# Patient Record
Sex: FEMALE | Race: WHITE | Employment: FULL TIME | URBAN - METROPOLITAN AREA
[De-identification: names, ages, dates, MRNs, and addresses within clinical notes are randomized per-mention and may not be internally consistent; named-entity substitution may affect disease eponyms.]

---

## 2018-01-17 ENCOUNTER — OFFICE VISIT (OUTPATIENT)
Dept: ENDOCRINOLOGY | Age: 48
End: 2018-01-17

## 2018-01-17 VITALS
DIASTOLIC BLOOD PRESSURE: 71 MMHG | SYSTOLIC BLOOD PRESSURE: 111 MMHG | HEART RATE: 66 BPM | HEIGHT: 62 IN | BODY MASS INDEX: 29.44 KG/M2 | WEIGHT: 160 LBS

## 2018-01-17 DIAGNOSIS — E06.3 HYPOTHYROIDISM DUE TO HASHIMOTO'S THYROIDITIS: Primary | ICD-10-CM

## 2018-01-17 DIAGNOSIS — E66.09 CLASS 1 OBESITY DUE TO EXCESS CALORIES WITHOUT SERIOUS COMORBIDITY IN ADULT, UNSPECIFIED BMI: ICD-10-CM

## 2018-01-17 DIAGNOSIS — F41.9 ANXIETY: ICD-10-CM

## 2018-01-17 DIAGNOSIS — E66.09 CLASS 1 OBESITY DUE TO EXCESS CALORIES WITHOUT SERIOUS COMORBIDITY WITH BODY MASS INDEX (BMI) OF 30.0 TO 30.9 IN ADULT: ICD-10-CM

## 2018-01-17 DIAGNOSIS — E03.8 HYPOTHYROIDISM DUE TO HASHIMOTO'S THYROIDITIS: Primary | ICD-10-CM

## 2018-01-17 PROBLEM — E66.811 CLASS 1 OBESITY DUE TO EXCESS CALORIES WITHOUT SERIOUS COMORBIDITY WITH BODY MASS INDEX (BMI) OF 30.0 TO 30.9 IN ADULT: Status: ACTIVE | Noted: 2018-01-17

## 2018-01-17 PROCEDURE — 99215 OFFICE O/P EST HI 40 MIN: CPT | Performed by: INTERNAL MEDICINE

## 2018-01-17 RX ORDER — PHENTERMINE HYDROCHLORIDE 37.5 MG/1
37.5 TABLET ORAL
Qty: 30 TABLET | Refills: 1 | Status: SHIPPED | OUTPATIENT
Start: 2018-01-17 | End: 2018-02-16

## 2018-01-17 RX ORDER — LEVOTHYROXINE AND LIOTHYRONINE 57; 13.5 UG/1; UG/1
90 TABLET ORAL DAILY
Qty: 90 TABLET | Refills: 1 | Status: SHIPPED | OUTPATIENT
Start: 2018-01-17 | End: 2018-05-07 | Stop reason: SDUPTHER

## 2018-01-17 ASSESSMENT — PATIENT HEALTH QUESTIONNAIRE - PHQ9
2. FEELING DOWN, DEPRESSED OR HOPELESS: 0
SUM OF ALL RESPONSES TO PHQ9 QUESTIONS 1 & 2: 0
1. LITTLE INTEREST OR PLEASURE IN DOING THINGS: 0
SUM OF ALL RESPONSES TO PHQ QUESTIONS 1-9: 0

## 2018-01-17 NOTE — PATIENT INSTRUCTIONS
you otherwise think the medication is not working properly. Taking more of this medication will not make it more effective and can cause serious, life-threatening side effects. Phentermine should be taken only for a short time, such as a few weeks. Do not stop using phentermine suddenly, or you could have unpleasant withdrawal symptoms. Ask your doctor how to safely stop using phentermine. Store at room temperature away from moisture and heat. Keep track of the amount of medicine used from each new bottle. Phentermine is a drug of abuse and you should be aware if anyone is using your medicine improperly or without a prescription. What happens if I miss a dose? Take the missed dose as soon as you remember. Skip the missed dose if it is almost time for your next scheduled dose. Do not  take extra medicine to make up the missed dose. What happens if I overdose? Seek emergency medical attention or call the Poison Help line at 1-726.454.2095. An overdose of phentermine can be fatal.  What should I avoid while taking phentermine? Drinking alcohol can increase certain side effects of phentermine. Phentermine may impair your thinking or reactions. Be careful if you drive or do anything that requires you to be alert. What are the possible side effects of phentermine? Get emergency medical help if you have any of these signs of an allergic reaction: hives; wheezing, chest tightness, trouble breathing; swelling of your face, lips, tongue, or throat.   Call your doctor at once if you have a serious side effect such as:  · feeling short of breath, even with mild exertion;  · chest pain, feeling like you might pass out;  · swelling in your ankles or feet;  · pounding heartbeats or fluttering in your chest;  · confusion or irritability, unusual thoughts or behavior;  · feelings of extreme happiness or sadness; or  · dangerously high blood pressure (severe headache, blurred vision, buzzing in your ears, anxiety, chest pain, shortness of breath, uneven heartbeats, seizure). Common side effects may include:  · feeling restless or hyperactive;  · headache, dizziness, tremors;  · sleep problems (insomnia);  · dry mouth or an unpleasant taste in your mouth;  · diarrhea or constipation, upset stomach; or  · increased or decreased interest in sex, impotence. This is not a complete list of side effects and others may occur. Call your doctor for medical advice about side effects. You may report side effects to FDA at 4-157-FDA-7456. What other drugs will affect phentermine? Taking this medicine with other stimulant drugs that make you restless or hyperactive can worsen these effects. Ask your doctor before taking phentermine with diet pills, other stimulants, or medicine to treat attention deficit hyperactivity disorder (ADHD). Tell your doctor about all medicines you use, and those you start or stop using during your treatment with phentermine, especially:  · an antidepressant --citalopram, escitalopram, fluoxetine, fluvoxamine, paroxetine, sertraline. This list is not complete. Other drugs may interact with phentermine, including prescription and over-the-counter medicines, vitamins, and herbal products. Not all possible interactions are listed in this medication guide. Where can I get more information? Your pharmacist can provide more information about phentermine. Remember, keep this and all other medicines out of the reach of children, never share your medicines with others, and use this medication only for the indication prescribed. Every effort has been made to ensure that the information provided by [address] is accurate, up-to-date, and complete, but no guarantee is made to that effect. Drug information contained herein may be time sensitive.  [facility] information has been compiled for use by healthcare practitioners and consumers in the [place] and therefore [facility] does not warrant that uses outside of the [place] are appropriate, unless specifically indicated otherwise. [facility]'s drug information does not endorse drugs, diagnose patients or recommend therapy. [facility] drug information is an informational resource designed to assist licensed healthcare practitioners in caring for their patients and/or to serve consumers viewing this service as a supplement to, and not a substitute for, the expertise, skill, knowledge and judgment of healthcare practitioners. The absence of a warning for a given drug or drug combination in no way should be construed to indicate that the drug or drug combination is safe, effective or appropriate for any given patient. [facility] does not assume any responsibility for any aspect of healthcare administered with the aid of information [facility] provides. The information contained herein is not intended to cover all possible uses, directions, precautions, warnings, drug interactions, allergic reactions, or adverse effects. If you have questions about the drugs you are taking, check with your doctor, nurse or pharmacist.  Copyright 9444-3885 [address]. Version: 9.02. Revision date: 10/4/2013. Care instructions adapted under license by [facility] ([facility]). If you have questions about a medical condition or this instruction, always ask your healthcare professional. [place] [address] any warranty or liability for your use of this information.

## 2018-01-18 DIAGNOSIS — E03.8 HYPOTHYROIDISM DUE TO HASHIMOTO'S THYROIDITIS: ICD-10-CM

## 2018-01-18 DIAGNOSIS — E06.3 HYPOTHYROIDISM DUE TO HASHIMOTO'S THYROIDITIS: ICD-10-CM

## 2018-01-18 DIAGNOSIS — E66.09 CLASS 1 OBESITY DUE TO EXCESS CALORIES WITHOUT SERIOUS COMORBIDITY IN ADULT, UNSPECIFIED BMI: ICD-10-CM

## 2018-01-18 LAB
ANION GAP SERPL CALCULATED.3IONS-SCNC: 13 MMOL/L (ref 3–16)
BUN BLDV-MCNC: 13 MG/DL (ref 7–20)
CALCIUM SERPL-MCNC: 9.3 MG/DL (ref 8.3–10.6)
CHLORIDE BLD-SCNC: 108 MMOL/L (ref 99–110)
CHOLESTEROL, TOTAL: 163 MG/DL (ref 0–199)
CO2: 25 MMOL/L (ref 21–32)
CREAT SERPL-MCNC: 0.6 MG/DL (ref 0.6–1.1)
GFR AFRICAN AMERICAN: >60
GFR NON-AFRICAN AMERICAN: >60
GLUCOSE BLD-MCNC: 85 MG/DL (ref 70–99)
GONADOTROPIN, CHORIONIC (HCG) QUANT: <5 MIU/ML
HDLC SERPL-MCNC: 81 MG/DL (ref 40–60)
LDL CHOLESTEROL CALCULATED: 72 MG/DL
POTASSIUM SERPL-SCNC: 4.2 MMOL/L (ref 3.5–5.1)
SODIUM BLD-SCNC: 146 MMOL/L (ref 136–145)
TRIGL SERPL-MCNC: 50 MG/DL (ref 0–150)
VLDLC SERPL CALC-MCNC: 10 MG/DL

## 2018-01-20 LAB — TSH, 3RD GENERATION: 0.89 MU/L (ref 0.3–4)

## 2018-02-28 DIAGNOSIS — E66.09 CLASS 1 OBESITY DUE TO EXCESS CALORIES WITHOUT SERIOUS COMORBIDITY WITH BODY MASS INDEX (BMI) OF 30.0 TO 30.9 IN ADULT: Primary | ICD-10-CM

## 2018-02-28 RX ORDER — PHENTERMINE HYDROCHLORIDE 37.5 MG/1
37.5 CAPSULE ORAL EVERY MORNING
Qty: 30 CAPSULE | Refills: 1 | Status: SHIPPED | OUTPATIENT
Start: 2018-02-28 | End: 2018-03-30

## 2018-02-28 RX ORDER — PHENTERMINE HYDROCHLORIDE 37.5 MG/1
37.5 TABLET ORAL
Qty: 30 TABLET | Refills: 0 | Status: CANCELLED | OUTPATIENT
Start: 2018-02-28 | End: 2018-03-30

## 2018-05-04 DIAGNOSIS — E06.3 HYPOTHYROIDISM DUE TO HASHIMOTO'S THYROIDITIS: ICD-10-CM

## 2018-05-04 DIAGNOSIS — E03.8 HYPOTHYROIDISM DUE TO HASHIMOTO'S THYROIDITIS: ICD-10-CM

## 2018-05-04 DIAGNOSIS — E66.09 CLASS 1 OBESITY DUE TO EXCESS CALORIES WITHOUT SERIOUS COMORBIDITY WITH BODY MASS INDEX (BMI) OF 30.0 TO 30.9 IN ADULT: ICD-10-CM

## 2018-05-04 LAB
A/G RATIO: 1.7 (ref 1.1–2.2)
ALBUMIN SERPL-MCNC: 4.3 G/DL (ref 3.4–5)
ALP BLD-CCNC: 58 U/L (ref 40–129)
ALT SERPL-CCNC: 14 U/L (ref 10–40)
ANION GAP SERPL CALCULATED.3IONS-SCNC: 14 MMOL/L (ref 3–16)
AST SERPL-CCNC: 18 U/L (ref 15–37)
BILIRUB SERPL-MCNC: 0.3 MG/DL (ref 0–1)
BUN BLDV-MCNC: 10 MG/DL (ref 7–20)
CALCIUM SERPL-MCNC: 9.1 MG/DL (ref 8.3–10.6)
CHLORIDE BLD-SCNC: 103 MMOL/L (ref 99–110)
CO2: 25 MMOL/L (ref 21–32)
CREAT SERPL-MCNC: 0.6 MG/DL (ref 0.6–1.1)
GFR AFRICAN AMERICAN: >60
GFR NON-AFRICAN AMERICAN: >60
GLOBULIN: 2.6 G/DL
GLUCOSE BLD-MCNC: 110 MG/DL (ref 70–99)
GONADOTROPIN, CHORIONIC (HCG) QUANT: <5 MIU/ML
POTASSIUM SERPL-SCNC: 5 MMOL/L (ref 3.5–5.1)
SODIUM BLD-SCNC: 142 MMOL/L (ref 136–145)
TOTAL PROTEIN: 6.9 G/DL (ref 6.4–8.2)
TSH SERPL DL<=0.05 MIU/L-ACNC: 0.49 UIU/ML (ref 0.27–4.2)

## 2018-05-07 ENCOUNTER — OFFICE VISIT (OUTPATIENT)
Dept: ENDOCRINOLOGY | Age: 48
End: 2018-05-07

## 2018-05-07 VITALS
HEIGHT: 62 IN | BODY MASS INDEX: 27.57 KG/M2 | OXYGEN SATURATION: 100 % | WEIGHT: 149.8 LBS | DIASTOLIC BLOOD PRESSURE: 80 MMHG | HEART RATE: 99 BPM | SYSTOLIC BLOOD PRESSURE: 124 MMHG

## 2018-05-07 DIAGNOSIS — E06.3 HYPOTHYROIDISM DUE TO HASHIMOTO'S THYROIDITIS: ICD-10-CM

## 2018-05-07 DIAGNOSIS — E03.8 HYPOTHYROIDISM DUE TO HASHIMOTO'S THYROIDITIS: ICD-10-CM

## 2018-05-07 PROCEDURE — 99214 OFFICE O/P EST MOD 30 MIN: CPT | Performed by: INTERNAL MEDICINE

## 2018-05-07 RX ORDER — LEVOTHYROXINE AND LIOTHYRONINE 57; 13.5 UG/1; UG/1
90 TABLET ORAL DAILY
Qty: 30 TABLET | Refills: 5 | Status: SHIPPED | OUTPATIENT
Start: 2018-05-07 | End: 2019-02-07 | Stop reason: SDUPTHER

## 2018-05-07 ASSESSMENT — PATIENT HEALTH QUESTIONNAIRE - PHQ9
1. LITTLE INTEREST OR PLEASURE IN DOING THINGS: 0
2. FEELING DOWN, DEPRESSED OR HOPELESS: 0
SUM OF ALL RESPONSES TO PHQ QUESTIONS 1-9: 0
SUM OF ALL RESPONSES TO PHQ9 QUESTIONS 1 & 2: 0

## 2018-06-27 ENCOUNTER — TELEPHONE (OUTPATIENT)
Dept: ENDOCRINOLOGY | Age: 48
End: 2018-06-27

## 2018-07-02 NOTE — TELEPHONE ENCOUNTER
lmov pt can p/u sample at [facility] location. m-thurs 8-4:30. Closed on the 4th. Asked Pt to return phone call with any questions.

## 2018-07-19 RX ORDER — LIRAGLUTIDE 6 MG/ML
INJECTION, SOLUTION SUBCUTANEOUS
Qty: 1 PEN | Refills: 0 | COMMUNITY
Start: 2018-07-19 | End: 2019-03-06

## 2018-07-19 NOTE — TELEPHONE ENCOUNTER
Pt said she wasn't aware saxenda would be an injectable medication. She is unsure if she is comfortable giving herself injection. Pt needs to know dosing instruction for saxenda, she did  the medication. She is aware another option could be referral to weight loss clinic. Patient aware Dr. Raquel Renae will be out of the country from 7/19/18 to 7/31/18. Responses to message can take 72 hours.

## 2019-02-07 DIAGNOSIS — E03.8 HYPOTHYROIDISM DUE TO HASHIMOTO'S THYROIDITIS: ICD-10-CM

## 2019-02-07 DIAGNOSIS — E06.3 HYPOTHYROIDISM DUE TO HASHIMOTO'S THYROIDITIS: ICD-10-CM

## 2019-02-07 RX ORDER — THYROID,PORK 90 MG
TABLET ORAL
Qty: 30 TABLET | Refills: 0 | Status: SHIPPED | OUTPATIENT
Start: 2019-02-07 | End: 2019-03-06 | Stop reason: SDUPTHER

## 2019-03-06 ENCOUNTER — OFFICE VISIT (OUTPATIENT)
Dept: ENDOCRINOLOGY | Age: 49
End: 2019-03-06
Payer: COMMERCIAL

## 2019-03-06 VITALS
SYSTOLIC BLOOD PRESSURE: 100 MMHG | DIASTOLIC BLOOD PRESSURE: 72 MMHG | HEIGHT: 62 IN | BODY MASS INDEX: 31.83 KG/M2 | OXYGEN SATURATION: 99 % | HEART RATE: 84 BPM | WEIGHT: 173 LBS

## 2019-03-06 DIAGNOSIS — E06.3 HYPOTHYROIDISM DUE TO HASHIMOTO'S THYROIDITIS: ICD-10-CM

## 2019-03-06 DIAGNOSIS — E03.8 HYPOTHYROIDISM DUE TO HASHIMOTO'S THYROIDITIS: ICD-10-CM

## 2019-03-06 DIAGNOSIS — E66.9 OBESITY (BMI 30-39.9): Primary | ICD-10-CM

## 2019-03-06 DIAGNOSIS — E66.9 OBESITY (BMI 30-39.9): ICD-10-CM

## 2019-03-06 PROCEDURE — 99214 OFFICE O/P EST MOD 30 MIN: CPT | Performed by: INTERNAL MEDICINE

## 2019-03-06 RX ORDER — PHENTERMINE HYDROCHLORIDE 37.5 MG/1
37.5 TABLET ORAL
Qty: 30 TABLET | Refills: 3 | Status: SHIPPED | OUTPATIENT
Start: 2019-03-06 | End: 2019-04-02 | Stop reason: SDUPTHER

## 2019-03-06 RX ORDER — THYROID,PORK 90 MG
TABLET ORAL
Qty: 30 TABLET | Refills: 3 | Status: SHIPPED | OUTPATIENT
Start: 2019-03-06 | End: 2019-06-05 | Stop reason: SDUPTHER

## 2019-03-07 LAB — GONADOTROPIN, CHORIONIC (HCG) QUANT: <5 MIU/ML

## 2019-03-13 DIAGNOSIS — E06.3 HYPOTHYROIDISM DUE TO HASHIMOTO'S THYROIDITIS: ICD-10-CM

## 2019-03-13 DIAGNOSIS — E03.8 HYPOTHYROIDISM DUE TO HASHIMOTO'S THYROIDITIS: ICD-10-CM

## 2019-03-13 DIAGNOSIS — E66.9 OBESITY (BMI 30-39.9): ICD-10-CM

## 2019-04-03 ENCOUNTER — NURSE ONLY (OUTPATIENT)
Dept: ENDOCRINOLOGY | Age: 49
End: 2019-04-03

## 2019-04-03 VITALS
DIASTOLIC BLOOD PRESSURE: 74 MMHG | HEART RATE: 89 BPM | BODY MASS INDEX: 31.1 KG/M2 | SYSTOLIC BLOOD PRESSURE: 106 MMHG | OXYGEN SATURATION: 98 % | WEIGHT: 169 LBS | HEIGHT: 62 IN

## 2019-04-03 RX ORDER — PHENTERMINE HYDROCHLORIDE 37.5 MG/1
37.5 TABLET ORAL
Qty: 30 TABLET | Refills: 0 | Status: SHIPPED | OUTPATIENT
Start: 2019-04-03 | End: 2019-05-01 | Stop reason: SDUPTHER

## 2019-05-01 ENCOUNTER — NURSE ONLY (OUTPATIENT)
Dept: ENDOCRINOLOGY | Age: 49
End: 2019-05-01

## 2019-05-01 VITALS
BODY MASS INDEX: 30.25 KG/M2 | DIASTOLIC BLOOD PRESSURE: 80 MMHG | OXYGEN SATURATION: 98 % | SYSTOLIC BLOOD PRESSURE: 122 MMHG | HEIGHT: 62 IN | HEART RATE: 84 BPM | WEIGHT: 164.4 LBS

## 2019-05-01 DIAGNOSIS — E03.8 HYPOTHYROIDISM DUE TO HASHIMOTO'S THYROIDITIS: ICD-10-CM

## 2019-05-01 DIAGNOSIS — E06.3 HYPOTHYROIDISM DUE TO HASHIMOTO'S THYROIDITIS: ICD-10-CM

## 2019-05-01 DIAGNOSIS — E66.9 OBESITY (BMI 30-39.9): ICD-10-CM

## 2019-05-01 RX ORDER — PHENTERMINE HYDROCHLORIDE 37.5 MG/1
37.5 TABLET ORAL
Qty: 30 TABLET | Refills: 0 | Status: SHIPPED | OUTPATIENT
Start: 2019-05-01 | End: 2019-05-31

## 2019-06-03 DIAGNOSIS — E06.3 HYPOTHYROIDISM DUE TO HASHIMOTO'S THYROIDITIS: ICD-10-CM

## 2019-06-03 DIAGNOSIS — E66.9 OBESITY (BMI 30-39.9): ICD-10-CM

## 2019-06-03 DIAGNOSIS — E03.8 HYPOTHYROIDISM DUE TO HASHIMOTO'S THYROIDITIS: ICD-10-CM

## 2019-06-03 LAB
A/G RATIO: 1.8 (ref 1.1–2.2)
ALBUMIN SERPL-MCNC: 4.3 G/DL (ref 3.4–5)
ALP BLD-CCNC: 60 U/L (ref 40–129)
ALT SERPL-CCNC: 22 U/L (ref 10–40)
ANION GAP SERPL CALCULATED.3IONS-SCNC: 15 MMOL/L (ref 3–16)
AST SERPL-CCNC: 23 U/L (ref 15–37)
BILIRUB SERPL-MCNC: 0.4 MG/DL (ref 0–1)
BUN BLDV-MCNC: 10 MG/DL (ref 7–20)
CALCIUM SERPL-MCNC: 9.2 MG/DL (ref 8.3–10.6)
CHLORIDE BLD-SCNC: 102 MMOL/L (ref 99–110)
CHOLESTEROL, TOTAL: 143 MG/DL (ref 0–199)
CO2: 23 MMOL/L (ref 21–32)
CREAT SERPL-MCNC: 0.6 MG/DL (ref 0.6–1.1)
GFR AFRICAN AMERICAN: >60
GFR NON-AFRICAN AMERICAN: >60
GLOBULIN: 2.4 G/DL
GLUCOSE BLD-MCNC: 125 MG/DL (ref 70–99)
GONADOTROPIN, CHORIONIC (HCG) QUANT: <5 MIU/ML
HDLC SERPL-MCNC: 64 MG/DL (ref 40–60)
LDL CHOLESTEROL CALCULATED: 68 MG/DL
POTASSIUM SERPL-SCNC: 4.9 MMOL/L (ref 3.5–5.1)
SODIUM BLD-SCNC: 140 MMOL/L (ref 136–145)
TOTAL PROTEIN: 6.7 G/DL (ref 6.4–8.2)
TRIGL SERPL-MCNC: 56 MG/DL (ref 0–150)
TSH SERPL DL<=0.05 MIU/L-ACNC: 0.7 UIU/ML (ref 0.27–4.2)
VITAMIN D 25-HYDROXY: 49.8 NG/ML
VLDLC SERPL CALC-MCNC: 11 MG/DL

## 2019-06-05 ENCOUNTER — OFFICE VISIT (OUTPATIENT)
Dept: ENDOCRINOLOGY | Age: 49
End: 2019-06-05
Payer: COMMERCIAL

## 2019-06-05 ENCOUNTER — TELEPHONE (OUTPATIENT)
Dept: ENDOCRINOLOGY | Age: 49
End: 2019-06-05

## 2019-06-05 VITALS
HEIGHT: 62 IN | DIASTOLIC BLOOD PRESSURE: 76 MMHG | BODY MASS INDEX: 29.59 KG/M2 | SYSTOLIC BLOOD PRESSURE: 106 MMHG | WEIGHT: 160.8 LBS | OXYGEN SATURATION: 100 % | HEART RATE: 81 BPM

## 2019-06-05 DIAGNOSIS — E03.8 HYPOTHYROIDISM DUE TO HASHIMOTO'S THYROIDITIS: Primary | ICD-10-CM

## 2019-06-05 DIAGNOSIS — E06.3 HYPOTHYROIDISM DUE TO HASHIMOTO'S THYROIDITIS: Primary | ICD-10-CM

## 2019-06-05 DIAGNOSIS — E66.09 CLASS 1 OBESITY DUE TO EXCESS CALORIES WITHOUT SERIOUS COMORBIDITY WITH BODY MASS INDEX (BMI) OF 30.0 TO 30.9 IN ADULT: ICD-10-CM

## 2019-06-05 DIAGNOSIS — E66.9 OBESITY (BMI 30-39.9): ICD-10-CM

## 2019-06-05 DIAGNOSIS — F41.9 ANXIETY: ICD-10-CM

## 2019-06-05 PROCEDURE — 99214 OFFICE O/P EST MOD 30 MIN: CPT | Performed by: INTERNAL MEDICINE

## 2019-06-05 RX ORDER — PHENTERMINE HYDROCHLORIDE 37.5 MG/1
37.5 TABLET ORAL
COMMUNITY
End: 2019-06-05 | Stop reason: SDUPTHER

## 2019-06-05 RX ORDER — PHENTERMINE HYDROCHLORIDE 37.5 MG/1
TABLET ORAL
Qty: 30 TABLET | Refills: 0 | Status: SHIPPED | OUTPATIENT
Start: 2019-06-05 | End: 2019-07-03 | Stop reason: SDUPTHER

## 2019-06-05 RX ORDER — THYROID,PORK 90 MG
TABLET ORAL
Qty: 30 TABLET | Refills: 3 | Status: SHIPPED | OUTPATIENT
Start: 2019-06-05 | End: 2019-09-11 | Stop reason: SDUPTHER

## 2019-06-05 NOTE — PROGRESS NOTES
Class 1 obesity due to excess calories without serious comorbidity with body mass index (BMI) of 30.0 to 30.9 in adult 01/17/2018    Anxiety 01/17/2018     History reviewed. No pertinent surgical history. Family History   Problem Relation Age of Onset    Cancer Mother     Cancer Father      Social History     Socioeconomic History    Marital status:      Spouse name: None    Number of children: None    Years of education: None    Highest education level: None   Occupational History    None   Social Needs    Financial resource strain: None    Food insecurity:     Worry: None     Inability: None    Transportation needs:     Medical: None     Non-medical: None   Tobacco Use    Smoking status: Never Smoker    Smokeless tobacco: Never Used   Substance and Sexual Activity    Alcohol use: Yes     Comment: sometime    Drug use: No    Sexual activity: None   Lifestyle    Physical activity:     Days per week: None     Minutes per session: None    Stress: None   Relationships    Social connections:     Talks on phone: None     Gets together: None     Attends Druze service: None     Active member of club or organization: None     Attends meetings of clubs or organizations: None     Relationship status: None    Intimate partner violence:     Fear of current or ex partner: None     Emotionally abused: None     Physically abused: None     Forced sexual activity: None   Other Topics Concern    None   Social History Narrative    None     Current Outpatient Medications   Medication Sig Dispense Refill    phentermine (ADIPEX-P) 37.5 MG tablet Take 37.5 mg by mouth every morning (before breakfast).  ARMOUR THYROID 90 MG tablet TAKE 1 TABLET BY MOUTH EVERY DAY 30 tablet 3     No current facility-administered medications for this visit.       Allergies   Allergen Reactions    Amoxicillin Rash    Penicillins Rash    Sulfa Antibiotics Rash         Review of Systems:  Constitutional: no fatigue, no fever, no recent weight gain, no recent weight loss, no changes in appetite  Eyes: no eye pain, no change in vision, no eye redness, no eye irritation, no double vision  Ears, nose, throat: no nasal congestion, no sore throat, no earache, no decrease in hearing, no hoarseness, no dry mouth, no sinus problems, no difficulty swallowing, no neck lumps, no dental problems, no mouth sores, no ringing in ears  Pulmonary: no shortness of breath, no wheezing, no dyspnea on exertion, no cough  Cardiovascular: no chest pain, no lower extremity edema, no orthopnea, no intermittent leg claudication, no palpitations  Gastrointestinal: no abdominal pain, no nausea, no vomiting, no diarrhea, no constipation, no heartburn, no bloating  Genitourinary: no dysuria, no urinary incontinence, no urinary hesitancy, no change in urinary frequency, no feelings of urinary urgency, no nocturia  Musculoskeletal: no joint swelling, no joint stiffness, no joint pain, no muscle cramps, no muscle pain, no bone pain, no fractures  Integument/Breast: no hair loss, but no skin rashes, no skin lesions, no itching, no dry skin, no breast pain, no breast mass, no skin hives, no skin discoloration, no nipple discharge  Neurological: no numbness, no tingling, no weakness, no confusion, no headaches, no dizziness, no fainting, no tremors, no decrease in memory, no balance problems  Psychiatric: no anxiety, no depression, no insomnia, no change in personality, no emotional problems, no stress  Hematologic/Lymphatic: no tendency for easy bleeding, no swollen lymph nodes, no tendency for easy bruising  Immunology: no seasonal allergies, no frequent infections, no frequent illnesses  Endocrine: no temperature intolerance, no hot flashes, no hand tremor    OBJECTIVE:   /76   Pulse 81   Ht 5' 2\" (1.575 m)   Wt 160 lb 12.8 oz (72.9 kg)   SpO2 100%   BMI 29.41 kg/m²   Wt Readings from Last 3 Encounters:   06/05/19 160 lb 12.8 oz (72.9 kg)   05/01/19 164 discussed GLP-1 agonist in detail --she did not like taking the [address] for weight loss as she is opposed to injectable since Started Adipex for approximately 12 weeks in March 2019   All possible Side effects were discussed in detail with patient in detail and patient was advised to call our office if she  notes any side effects shewas given are written handout detailing side effects from the medication. --Advised to get monthly weight checks monthly pregnancy test, and follow-up in 3 months   She took Adipex daily from  January 2017 -- nov 2017 and then again from jan 2018--may 2018   She lost almost 34 lbs  in 2017 on adipex but was watching her diet too --and another 10 lbs in 2018   All sideffects were discussed in detail   Again discussed to continue with dietary modification   She tried contrave from september to December 2016 ---no weight loss   Qsymia was not approved   Phentermine has many side effects discussed with the patient in detail  which include: hypertension, ischemia, palpitations, tachycardia, dizziness, dysphoria, euphoria, headache, insomnia, overstimulation, psychosis, restlessness, and urticarial, change in libido, constipation, diarrhea, gastrointestinal distress, unpleasant taste, xerostomia, impotence, tremors, acquired valvular heart disease (regurgitant), and primary pulmonary hypertension      3.  Anxiety/DEPRESSION was prescribed  Zoloft in the past ---she never took   Feels improved   and does not feel she needs medication anymore     Rt breast mass ---it turned out to be benign   She had mammogram June 2016     TiREDNESS AND FATIGUE wt gain   Vit d level have improved she is advised to increase vitamin d for winter months   she is not anemic   --tiredness and fatigue have improved since she lost weight   Specially when she is on Adipex she notes significant improvement in her fatigue      Reviewed and/or ordered clinical lab results Yes  Reviewed and/or ordered radiology tests Yes Reviewed and/or ordered other diagnostic tests Yes  Made a decision to obtain old records Yes  Reviewed and summarized old records Yes      Rosalba Madrid was counseled regarding symptoms of current diagnosis, course and complications of disease if inadequately treated, side effects of medications, diagnosis, treatment options, and prognosis, risks, benefits, complications, and alternatives of treatment, labs, imaging and other studies and treatment targets and goals. She understands instructions and counseling. No follow-ups on file.

## 2019-06-06 NOTE — TELEPHONE ENCOUNTER
Yes we can try Qsymia if her insurance covers which has lower dose of Adipex in it   Or Belviq   She can find out from insurance which will be better covered

## 2019-07-03 ENCOUNTER — NURSE ONLY (OUTPATIENT)
Dept: ENDOCRINOLOGY | Age: 49
End: 2019-07-03

## 2019-07-03 VITALS
WEIGHT: 154.8 LBS | DIASTOLIC BLOOD PRESSURE: 78 MMHG | BODY MASS INDEX: 28.49 KG/M2 | OXYGEN SATURATION: 99 % | HEART RATE: 88 BPM | SYSTOLIC BLOOD PRESSURE: 110 MMHG | HEIGHT: 62 IN

## 2019-07-03 DIAGNOSIS — E66.9 OBESITY (BMI 30-39.9): ICD-10-CM

## 2019-07-03 DIAGNOSIS — E03.8 HYPOTHYROIDISM DUE TO HASHIMOTO'S THYROIDITIS: ICD-10-CM

## 2019-07-03 DIAGNOSIS — E06.3 HYPOTHYROIDISM DUE TO HASHIMOTO'S THYROIDITIS: ICD-10-CM

## 2019-07-03 RX ORDER — PHENTERMINE HYDROCHLORIDE 37.5 MG/1
TABLET ORAL
Qty: 30 TABLET | Refills: 0 | Status: SHIPPED | OUTPATIENT
Start: 2019-07-03 | End: 2019-08-01 | Stop reason: SDUPTHER

## 2019-08-01 ENCOUNTER — NURSE ONLY (OUTPATIENT)
Dept: ENDOCRINOLOGY | Age: 49
End: 2019-08-01

## 2019-08-01 VITALS
WEIGHT: 150.6 LBS | BODY MASS INDEX: 27.55 KG/M2 | HEART RATE: 83 BPM | SYSTOLIC BLOOD PRESSURE: 110 MMHG | OXYGEN SATURATION: 98 % | DIASTOLIC BLOOD PRESSURE: 82 MMHG

## 2019-08-01 DIAGNOSIS — E06.3 HYPOTHYROIDISM DUE TO HASHIMOTO'S THYROIDITIS: ICD-10-CM

## 2019-08-01 DIAGNOSIS — E66.9 OBESITY (BMI 30-39.9): ICD-10-CM

## 2019-08-01 DIAGNOSIS — E03.8 HYPOTHYROIDISM DUE TO HASHIMOTO'S THYROIDITIS: ICD-10-CM

## 2019-08-01 RX ORDER — PHENTERMINE HYDROCHLORIDE 37.5 MG/1
TABLET ORAL
Qty: 30 TABLET | Refills: 0 | Status: SHIPPED | OUTPATIENT
Start: 2019-08-01 | End: 2019-08-28 | Stop reason: SDUPTHER

## 2019-08-28 ENCOUNTER — TELEPHONE (OUTPATIENT)
Dept: ENDOCRINOLOGY | Age: 49
End: 2019-08-28

## 2019-08-28 DIAGNOSIS — E03.8 HYPOTHYROIDISM DUE TO HASHIMOTO'S THYROIDITIS: Primary | ICD-10-CM

## 2019-08-28 DIAGNOSIS — E66.9 OBESITY (BMI 30-39.9): ICD-10-CM

## 2019-08-28 DIAGNOSIS — E06.3 HYPOTHYROIDISM DUE TO HASHIMOTO'S THYROIDITIS: Primary | ICD-10-CM

## 2019-08-28 RX ORDER — PHENTERMINE HYDROCHLORIDE 37.5 MG/1
TABLET ORAL
Qty: 30 TABLET | Refills: 0 | Status: SHIPPED | OUTPATIENT
Start: 2019-08-28 | End: 2019-09-11 | Stop reason: SDUPTHER

## 2019-08-28 RX ORDER — PHENTERMINE HYDROCHLORIDE 37.5 MG/1
TABLET ORAL
Qty: 30 TABLET | Refills: 0 | Status: SHIPPED | OUTPATIENT
Start: 2019-08-28 | End: 2019-08-28 | Stop reason: SDUPTHER

## 2019-09-06 DIAGNOSIS — F41.9 ANXIETY: ICD-10-CM

## 2019-09-06 DIAGNOSIS — E06.3 HYPOTHYROIDISM DUE TO HASHIMOTO'S THYROIDITIS: ICD-10-CM

## 2019-09-06 DIAGNOSIS — E03.8 HYPOTHYROIDISM DUE TO HASHIMOTO'S THYROIDITIS: ICD-10-CM

## 2019-09-06 DIAGNOSIS — E66.9 OBESITY (BMI 30-39.9): ICD-10-CM

## 2019-09-06 LAB
A/G RATIO: 1.8 (ref 1.1–2.2)
ALBUMIN SERPL-MCNC: 4.6 G/DL (ref 3.4–5)
ALP BLD-CCNC: 66 U/L (ref 40–129)
ALT SERPL-CCNC: 29 U/L (ref 10–40)
ANION GAP SERPL CALCULATED.3IONS-SCNC: 17 MMOL/L (ref 3–16)
AST SERPL-CCNC: 26 U/L (ref 15–37)
BILIRUB SERPL-MCNC: <0.2 MG/DL (ref 0–1)
BUN BLDV-MCNC: 13 MG/DL (ref 7–20)
CALCIUM SERPL-MCNC: 9.4 MG/DL (ref 8.3–10.6)
CHLORIDE BLD-SCNC: 105 MMOL/L (ref 99–110)
CHOLESTEROL, TOTAL: 171 MG/DL (ref 0–199)
CO2: 23 MMOL/L (ref 21–32)
CREAT SERPL-MCNC: 0.7 MG/DL (ref 0.6–1.1)
GFR AFRICAN AMERICAN: >60
GFR NON-AFRICAN AMERICAN: >60
GLOBULIN: 2.6 G/DL
GLUCOSE BLD-MCNC: 95 MG/DL (ref 70–99)
HDLC SERPL-MCNC: 68 MG/DL (ref 40–60)
LDL CHOLESTEROL CALCULATED: 88 MG/DL
POTASSIUM SERPL-SCNC: 4.1 MMOL/L (ref 3.5–5.1)
SODIUM BLD-SCNC: 145 MMOL/L (ref 136–145)
T4 FREE: 1.1 NG/DL (ref 0.9–1.8)
TOTAL PROTEIN: 7.2 G/DL (ref 6.4–8.2)
TRIGL SERPL-MCNC: 77 MG/DL (ref 0–150)
TSH SERPL DL<=0.05 MIU/L-ACNC: 0.85 UIU/ML (ref 0.27–4.2)
VITAMIN D 25-HYDROXY: 55 NG/ML
VLDLC SERPL CALC-MCNC: 15 MG/DL

## 2019-09-07 LAB
ESTIMATED AVERAGE GLUCOSE: 105.4 MG/DL
HBA1C MFR BLD: 5.3 %

## 2019-09-11 ENCOUNTER — TELEPHONE (OUTPATIENT)
Dept: ENDOCRINOLOGY | Age: 49
End: 2019-09-11

## 2019-09-11 ENCOUNTER — OFFICE VISIT (OUTPATIENT)
Dept: ENDOCRINOLOGY | Age: 49
End: 2019-09-11
Payer: COMMERCIAL

## 2019-09-11 VITALS
HEIGHT: 62 IN | DIASTOLIC BLOOD PRESSURE: 76 MMHG | HEART RATE: 91 BPM | BODY MASS INDEX: 26.94 KG/M2 | SYSTOLIC BLOOD PRESSURE: 118 MMHG | OXYGEN SATURATION: 99 % | WEIGHT: 146.4 LBS

## 2019-09-11 DIAGNOSIS — E66.09 CLASS 1 OBESITY DUE TO EXCESS CALORIES WITHOUT SERIOUS COMORBIDITY WITH BODY MASS INDEX (BMI) OF 30.0 TO 30.9 IN ADULT: ICD-10-CM

## 2019-09-11 DIAGNOSIS — E66.9 OBESITY (BMI 30-39.9): ICD-10-CM

## 2019-09-11 DIAGNOSIS — E06.3 HYPOTHYROIDISM DUE TO HASHIMOTO'S THYROIDITIS: ICD-10-CM

## 2019-09-11 DIAGNOSIS — E03.8 HYPOTHYROIDISM DUE TO HASHIMOTO'S THYROIDITIS: ICD-10-CM

## 2019-09-11 DIAGNOSIS — F41.9 ANXIETY: Primary | ICD-10-CM

## 2019-09-11 PROCEDURE — 99214 OFFICE O/P EST MOD 30 MIN: CPT | Performed by: INTERNAL MEDICINE

## 2019-09-11 RX ORDER — THYROID,PORK 90 MG
TABLET ORAL
Qty: 30 TABLET | Refills: 3 | Status: SHIPPED | OUTPATIENT
Start: 2019-09-11 | End: 2020-02-17

## 2019-09-11 RX ORDER — PHENTERMINE HYDROCHLORIDE 37.5 MG/1
TABLET ORAL
Qty: 30 TABLET | Refills: 0 | Status: SHIPPED | OUTPATIENT
Start: 2019-09-11 | End: 2019-10-11

## 2019-09-11 NOTE — PROGRESS NOTES
01/17/2018    Class 1 obesity due to excess calories without serious comorbidity with body mass index (BMI) of 30.0 to 30.9 in adult 01/17/2018    Anxiety 01/17/2018     History reviewed. No pertinent surgical history. Family History   Problem Relation Age of Onset    Cancer Mother     Cancer Father      Social History     Socioeconomic History    Marital status:      Spouse name: None    Number of children: None    Years of education: None    Highest education level: None   Occupational History    None   Social Needs    Financial resource strain: None    Food insecurity:     Worry: None     Inability: None    Transportation needs:     Medical: None     Non-medical: None   Tobacco Use    Smoking status: Never Smoker    Smokeless tobacco: Never Used   Substance and Sexual Activity    Alcohol use: Yes     Comment: sometime    Drug use: No    Sexual activity: None   Lifestyle    Physical activity:     Days per week: None     Minutes per session: None    Stress: None   Relationships    Social connections:     Talks on phone: None     Gets together: None     Attends Sabianist service: None     Active member of club or organization: None     Attends meetings of clubs or organizations: None     Relationship status: None    Intimate partner violence:     Fear of current or ex partner: None     Emotionally abused: None     Physically abused: None     Forced sexual activity: None   Other Topics Concern    None   Social History Narrative    None     Current Outpatient Medications   Medication Sig Dispense Refill    phentermine (ADIPEX-P) 37.5 MG tablet Take 1 tab PO Daily 30 tablet 0    ARMOUR THYROID 90 MG tablet TAKE 1 TABLET BY MOUTH EVERY DAY 30 tablet 3    Phentermine-Topiramate 11.25-69 MG CP24 Take 1 tablet by mouth daily for 30 days. 30 capsule 3     No current facility-administered medications for this visit.       Allergies   Allergen Reactions    Amoxicillin Rash    Penicillins Rash    Sulfa Antibiotics Rash         Review of Systems:  I have reviewed the review of system questionnaire filled by the patient . Patient was advised to contact PCP for non endocrine signs and symptoms       OBJECTIVE:   /76   Pulse 91   Ht 5' 2\" (1.575 m)   Wt 146 lb 6.4 oz (66.4 kg)   SpO2 99%   BMI 26.78 kg/m²   Wt Readings from Last 3 Encounters:   09/11/19 146 lb 6.4 oz (66.4 kg)   08/01/19 150 lb 9.6 oz (68.3 kg)   07/03/19 154 lb 12.8 oz (70.2 kg)       Physical Exam:  Constitutional: no acute distress, well appearing, well nourished  Psychiatric: oriented to person, place and time, judgement, insight and normal, recent and remote memory and intact and mood, affect are normal  Skin: skin and subcutaneous tissue is normal without mass, normal turgor  Head and Face: examination of head and face revealed no abnormalities  Eyes: no lid or conjunctival swelling, no erythema or discharge, pupils are normal, equal, round, and reactive to light  Ears/Nose: external inspection of ears and nose revealed no abnormalities, hearing is grossly normal  Oropharynx/Mouth/Face: lips, tongue and gums are normal with no lesions, the voice quality was normal  Neck: neck is supple and symmetric, with midline trachea and no masses, thyroid is normal  Pulmonary: no increased work of breathing or signs of respiratory distress, lungs are clear to auscultation  Cardiovascular: normal heart rate and rhythm, normal S1 and S2, no murmurs and pedal pulses and 2+ bilaterally, No edema  Musculoskeletal: normal gait and station, exam of the digits and nails are normal  Neurological: normal coordination, normal general cortical function    Lab Review:  Lab Results   Component Value Date    TSH 0.85 09/06/2019    TSH 0.70 06/03/2019    TSH 1.24 02/28/2019     No results found for: FREET4     ASSESSMENT/PLAN:    ---.  Hypothyroidism due to Hashimoto's thyroiditis  She has been on [place] 90 mg  for years now ---she was on

## 2020-01-27 ENCOUNTER — NURSE ONLY (OUTPATIENT)
Dept: ENDOCRINOLOGY | Age: 50
End: 2020-01-27

## 2020-01-27 VITALS
SYSTOLIC BLOOD PRESSURE: 126 MMHG | BODY MASS INDEX: 25.06 KG/M2 | HEART RATE: 77 BPM | OXYGEN SATURATION: 100 % | DIASTOLIC BLOOD PRESSURE: 78 MMHG | WEIGHT: 137 LBS

## 2020-02-18 RX ORDER — THYROID,PORK 90 MG
TABLET ORAL
Qty: 30 TABLET | Refills: 3 | Status: SHIPPED | OUTPATIENT
Start: 2020-02-18 | End: 2020-06-15

## 2020-05-15 VITALS — BODY MASS INDEX: 23.78 KG/M2 | WEIGHT: 130 LBS

## 2020-06-15 RX ORDER — THYROID,PORK 90 MG
TABLET ORAL
Qty: 30 TABLET | Refills: 0 | Status: SHIPPED | OUTPATIENT
Start: 2020-06-15 | End: 2020-08-03 | Stop reason: SDUPTHER

## 2020-07-13 RX ORDER — THYROID,PORK 90 MG
TABLET ORAL
Qty: 30 TABLET | Refills: 0 | OUTPATIENT
Start: 2020-07-13

## 2020-08-03 RX ORDER — THYROID,PORK 90 MG
TABLET ORAL
Qty: 30 TABLET | Refills: 0 | Status: SHIPPED | OUTPATIENT
Start: 2020-08-03 | End: 2020-08-31

## 2020-08-03 NOTE — TELEPHONE ENCOUNTER
Pt calling & asking why her rx was denied told pt she needed an appt so she went ahead and scheduled appt for 8-17-20.  She would like her refill sent to pharmacy

## 2020-08-04 DIAGNOSIS — E06.3 HYPOTHYROIDISM DUE TO HASHIMOTO'S THYROIDITIS: ICD-10-CM

## 2020-08-04 DIAGNOSIS — E66.9 OBESITY (BMI 30-39.9): ICD-10-CM

## 2020-08-04 DIAGNOSIS — E03.8 HYPOTHYROIDISM DUE TO HASHIMOTO'S THYROIDITIS: ICD-10-CM

## 2020-08-04 LAB
T3 TOTAL: 1.04 NG/ML (ref 0.8–2)
TSH SERPL DL<=0.05 MIU/L-ACNC: 1.16 UIU/ML (ref 0.27–4.2)

## 2020-08-17 ENCOUNTER — OFFICE VISIT (OUTPATIENT)
Dept: ENDOCRINOLOGY | Age: 50
End: 2020-08-17
Payer: COMMERCIAL

## 2020-08-17 PROCEDURE — 99442 PR PHYS/QHP TELEPHONE EVALUATION 11-20 MIN: CPT | Performed by: INTERNAL MEDICINE

## 2020-08-17 RX ORDER — PHENTERMINE AND TOPIRAMATE 11.25; 69 MG/1; MG/1
CAPSULE, EXTENDED RELEASE ORAL
COMMUNITY
End: 2020-08-17

## 2020-08-17 RX ORDER — PHENTERMINE AND TOPIRAMATE 15; 92 MG/1; MG/1
CAPSULE, EXTENDED RELEASE ORAL
Qty: 30 CAPSULE | Refills: 1 | Status: SHIPPED | OUTPATIENT
Start: 2020-08-17 | End: 2020-08-17

## 2020-08-17 RX ORDER — PHENTERMINE AND TOPIRAMATE 11.25; 69 MG/1; MG/1
CAPSULE, EXTENDED RELEASE ORAL
Qty: 30 CAPSULE | Refills: 3 | Status: CANCELLED | OUTPATIENT
Start: 2020-08-17 | End: 2020-11-17

## 2020-08-17 NOTE — PROGRESS NOTES
SUBJECTIVE:    Pt seen for hypothyroidism and obesity  Concepcion Velazco had rt Thyroid lobectomy at age 26 years which ended up being benign. Pt stays tired and fatigued -- she has a son who needed to be cathetrized every 4 hrs so she goes to bed at 11.30 and wakes up to 6-7 hours   she has Depression and stress and was started on Zoloft But now doesn't take it any more    She took Contrave for 3 months with no wt loss and was very frustrated about that   She took  Adipex from  January 2017---nov 2017  she  lost almost approximately 20 lbs since December 2016   She had tried Contrave and didn't lose any weight     We resumed Adipex in jan 2018  Lost additional 10 lbs by May 2018. Stopped Adipex in May 2018. She  gained 24 pounds after stopping adipex from may to April 2019     History of radiation to patient's neck: No  Resent iodine exposure: No   Family history includes no thyroid abnormalities. Family history of thyroid cancer: No      She is on Adipex for 5 months it was clearly discussed with patient -Due to their side effects and potential for abuse, adipex use has a limit to short-term (?12 weeks) use. Pt denies any adverse effects include increase in heart rate, blood pressure, insomnia, dry mouth, constipation, nervousness. Pt doesn't have heart disease, poorly controlled hypertension, pulmonary hypertension, or history of addiction or drug abuse  Pt doesn't have a history of CVD, hyperthyroidism, glaucoma, MAO inhibitor-therapy, agitated states, pregnancy, or breast feeding.   She stopped  Adipex --- October 2019     INTERIM      she checked her weight was 130 today she feels she is stuck at this weight   She is not counting her calories but watches her diet   She is exercising daily , walking and stationary bike and lifting weights as well     Past Medical History:   Diagnosis Date    Anxiety 1/17/2018    Class 1 obesity due to excess calories without serious comorbidity with body mass index (BMI) facility-administered medications for this visit. Allergies   Allergen Reactions    Amoxicillin Rash    Penicillins Rash    Sulfa Antibiotics Rash         Review of Systems:  Constitutional  Patient denies any weight loss denies any weight gain,  Eyes   Pt denies any double vision blurred vision or decreased peripheral vision  Head ear nose throat  Denies any trouble swallowing denies any hoarseness  Denies any shortness of breath denies  Cardiovascular  Denies any chest pain denies any palpitations currently  Gastrointestinal  Denies any nausea ,vomiting ,constipation or diarrhea  Hematological/lymphatic  Denies any blood clot denies or any painful lymph nodes  Genitourinary  Denies any frequent urination denies any nighttime urination  Skin  Denies any acne denies any changes in facial body hair denies any non healing wounds Musculoskeletal   denies any joint or bone pain ,denies any muscle weakness ,denies any new fracture  Endocrine  Denies any excessive thirst denies any excessive heat intolerance or cold intolerance . OBJECTIVE:   There were no vitals taken for this visit. Wt Readings from Last 3 Encounters:   05/15/20 130 lb (59 kg)   01/27/20 137 lb (62.1 kg)   09/11/19 146 lb 6.4 oz (66.4 kg)       Physical Exam:    Patient is  alert oriented to time ,place and person. Both recent and remote memory intact . Patient has weighed themselves in the last few  weeks and it has been stable       Lab Review:  Lab Results   Component Value Date    TSH 1.16 08/04/2020    TSH 0.85 09/06/2019    TSH 0.70 06/03/2019     No results found for: FREET4     ASSESSMENT/PLAN:    ---. Hypothyroidism due to Hashimoto's thyroiditis  She has been on [place] 90 mg  for years now ---she was on synthroid prior to that   Her TSH was 7.4 (8/2017)>> 0.24 (11/2017) >>1.16 in aug 2020   No change in dose today   Since she takes it regularly her TSH is now stable.      Thyroid usg revealed normal appearing thyroid lobes in feb 2012     RT partial lobectomy in 1991 with benign results     --- Class 1 obesity due to excess calories without serious comorbidity in adult, unspecified BMI  She has lost  27 lbs since starting Adipex 173>>146>>130 lb in aug 2020   Stopped adipex in sept/oct 2019   Started Qsymia in oct 2019 ---sent to [facility]   Will stop in a month   She has plateued weight   Will increase the dose     --Advised to get monthly weight checks monthly pregnancy test, and follow-up in 3 months   She took Adipex daily from  January 2017 -- nov 2017 and then again from jan 2018--may 2018   She lost almost 34 lbs  in 2017 on adipex but was watching her diet too --and another 10 lbs in 2018   She tried contrave from september to December 2016 ---no weight loss   She has been taking Qsymia for 10 months' she was advised that this is not FDA approved duration of use and will have to stop the medication   Pt wants to continue and despite no longterm studies and data is willing to take the ris  She denies any SE advised to get CCP done at 2 months follow up       --- Anxiety /DEPRESSION   She feels a whole lot better and is not having anxiety anymore.   She feels that her energy level and mood gets stabilized on Adipex  -was prescribed  Zoloft in the past ---she never took   Feels improved   and does not feel she needs medication anymore     ---Rt breast mass ---it turned out to be benign   She had mammogram June 2016     ---TiREDNESS AND FATIGUE wt gain   Vit d level have improved she is advised to increase vitamin d for winter months   she is not anemic   --tiredness and fatigue have improved since she lost weight and is on Adipex  Specially when she is on Adipex she notes significant improvement in her fatigue      Reviewed and/or ordered clinical lab results Yes  Reviewed and/or ordered radiology tests Yes   Reviewed and/or ordered other diagnostic tests Yes  Made a decision to obtain old records Yes  Reviewed and summarized old records

## 2020-11-18 ENCOUNTER — VIRTUAL VISIT (OUTPATIENT)
Dept: ENDOCRINOLOGY | Age: 50
End: 2020-11-18
Payer: COMMERCIAL

## 2020-11-18 PROCEDURE — 99442 PR PHYS/QHP TELEPHONE EVALUATION 11-20 MIN: CPT | Performed by: INTERNAL MEDICINE

## 2020-11-18 RX ORDER — PHENTERMINE AND TOPIRAMATE 15; 92 MG/1; MG/1
CAPSULE, EXTENDED RELEASE ORAL
COMMUNITY
End: 2021-02-22

## 2020-11-18 RX ORDER — PHENTERMINE AND TOPIRAMATE 15; 92 MG/1; MG/1
CAPSULE, EXTENDED RELEASE ORAL
Qty: 30 CAPSULE | Status: CANCELLED | OUTPATIENT
Start: 2020-11-18

## 2020-11-18 RX ORDER — PHENTERMINE AND TOPIRAMATE 11.25; 69 MG/1; MG/1
CAPSULE, EXTENDED RELEASE ORAL
COMMUNITY
End: 2020-11-18

## 2020-11-18 NOTE — PROGRESS NOTES
Left VM for patient to call office back to schedule a f/u appt. Reminder letter and lab orders mailed.

## 2020-11-18 NOTE — PROGRESS NOTES
SUBJECTIVE:    Pt seen for hypothyroidism and obesity  Herb Friend had rt Thyroid lobectomy at age 26 years which ended up being benign. Pt stays tired and fatigued -- she has a son who needed to be cathetrized every 4 hrs so she goes to bed at 11.30 and wakes up to 6-7 hours   she has Depression and stress and was started on Zoloft But now doesn't take it any more    She took Contrave for 3 months with no wt loss and was very frustrated about that   She took  Adipex from  January 2017---nov 2017  she  lost almost approximately 20 lbs since December 2016   She had tried Contrave and didn't lose any weight     We resumed Adipex in jan 2018  Lost additional 10 lbs by May 2018. Stopped Adipex in May 2018. She  gained 24 pounds after stopping adipex from may to April 2019     History of radiation to patient's neck: No  Resent iodine exposure: No   Family history includes no thyroid abnormalities. Family history of thyroid cancer: No      She is on Adipex for 5 months it was clearly discussed with patient -Due to their side effects and potential for abuse, adipex use has a limit to short-term (?12 weeks) use. Pt denies any adverse effects include increase in heart rate, blood pressure, insomnia, dry mouth, constipation, nervousness. Pt doesn't have heart disease, poorly controlled hypertension, pulmonary hypertension, or history of addiction or drug abuse  Pt doesn't have a history of CVD, hyperthyroidism, glaucoma, MAO inhibitor-therapy, agitated states, pregnancy, or breast feeding.   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 No pertinent surgical history. Family History   Problem Relation Age of Onset    Cancer Mother     Cancer Father      Social History     Socioeconomic History    Marital status:      Spouse name: None    Number of children: None    Years of education: None    Highest education level: None   Occupational History    None   Social Needs    Financial resource strain: None    Food insecurity     Worry: None     Inability: None    Transportation needs     Medical: None     Non-medical: None   Tobacco Use    Smoking status: Never Smoker    Smokeless tobacco: Never Used   Substance and Sexual Activity    Alcohol use: Yes     Comment: sometime    Drug use: No    Sexual activity: None   Lifestyle    Physical activity     Days per week: None     Minutes per session: None    Stress: None   Relationships    Social connections     Talks on phone: None     Gets together: None     Attends Confucianist service: None     Active member of club or organization: None     Attends meetings of clubs or organizations: None     Relationship status: None    Intimate partner violence     Fear of current or ex partner: None     Emotionally abused: None     Physically abused: None     Forced sexual activity: None   Other Topics Concern    None   Social History Narrative    None     Current Outpatient Medications   Medication Sig Dispense Refill    Phentermine-Topiramate (QSYMIA) 15-92 MG CP24 Take by mouth.       ARMOUR THYROID 90 MG tablet TAKE 1 TABLET BY MOUTH DAILY 30 she takes it regularly her TSH is now stable. Thyroid usg revealed normal appearing thyroid lobes in feb 2012     RT partial lobectomy in 1991 with benign results     --- Class 1 obesity due to excess calories without serious comorbidity in adult, unspecified BMI  She  lost  27 lbs since starting Adipex 173>>146>>130>>126 lb in nov 2020   Stopped adipex in sept/oct 2019   Started Qsymia in oct 2019 ---stopped nov 2020   She has plateued weight she was advised that it is not FDA approved to take the medication were prolonged period of time     She took Adipex daily from  January 2017 -- nov 2017 and then again from jan 2018--may 2018   She lost almost 34 lbs  in 2017 on adipex but was watching her diet too --and another 10 lbs in 2018   She tried contrave from september to December 2016 ---no weight loss   She has been taking Qsymia for 10 months' she was advised that this is not FDA approved duration of use and will have to stop the medication   Pt wants to continue and despite no longterm studies and data is willing to take the ris  She denies any SE advised to get CCP done at 2 months follow up       --- Anxiety /DEPRESSION   She feels a whole lot better and is not having anxiety anymore.   She feels that her energy level and mood gets stabilized on Adipex  -was prescribed  Zoloft in the past ---she never took   Feels improved   and does not feel she needs medication anymore     ---Rt breast mass ---it turned out to be benign   She had mammogram June 2016     ---TiREDNESS AND FATIGUE wt gain   Vit d level have improved she is advised to increase vitamin d for winter months   she is not anemic   --tiredness and fatigue have improved since she lost weight and is on Adipex  Specially when she is on Adipex she notes significant improvement in her fatigue      Reviewed and/or ordered clinical lab results Yes  Reviewed and/or ordered radiology tests Yes   Reviewed and/or ordered other diagnostic tests Yes  Made a decision to obtain old records Yes  Reviewed and summarized old records Yes      Maurice Ruiz was counseled regarding symptoms of current diagnosis, course and complications of disease if inadequately treated, side effects of medications, diagnosis, treatment options, and prognosis, risks, benefits, complications, and alternatives of treatment, labs, imaging and other studies and treatment targets and goals. She understands instructions and counseling. This was a phone conversation in [place] of in-person visit due to coronavirus emergency. Patient provided verbal consent for an \"over the phone visit'. Location of patient; home  Total time spent  minutes on this call conducting an interview, collecting data and reviewing her chart, performing a limited exam by phone and educating the patient on my assessment and plan. Return in about 3 months (around 2/18/2021).

## 2021-01-18 DIAGNOSIS — E06.3 HYPOTHYROIDISM DUE TO HASHIMOTO'S THYROIDITIS: ICD-10-CM

## 2021-01-18 DIAGNOSIS — E03.8 HYPOTHYROIDISM DUE TO HASHIMOTO'S THYROIDITIS: ICD-10-CM

## 2021-01-18 DIAGNOSIS — E66.9 OBESITY (BMI 30-39.9): ICD-10-CM

## 2021-01-18 RX ORDER — THYROID,PORK 90 MG
TABLET ORAL
Qty: 30 TABLET | Refills: 3 | Status: SHIPPED | OUTPATIENT
Start: 2021-01-18 | End: 2021-05-20

## 2021-02-19 DIAGNOSIS — E06.3 HYPOTHYROIDISM DUE TO HASHIMOTO'S THYROIDITIS: ICD-10-CM

## 2021-02-19 DIAGNOSIS — E03.8 HYPOTHYROIDISM DUE TO HASHIMOTO'S THYROIDITIS: ICD-10-CM

## 2021-02-19 DIAGNOSIS — E55.9 VITAMIN D DEFICIENCY: ICD-10-CM

## 2021-02-19 LAB
A/G RATIO: 1.6 (ref 1.1–2.2)
ALBUMIN SERPL-MCNC: 4.2 G/DL (ref 3.4–5)
ALP BLD-CCNC: 61 U/L (ref 40–129)
ALT SERPL-CCNC: 17 U/L (ref 10–40)
ANION GAP SERPL CALCULATED.3IONS-SCNC: 12 MMOL/L (ref 3–16)
AST SERPL-CCNC: 17 U/L (ref 15–37)
BILIRUB SERPL-MCNC: <0.2 MG/DL (ref 0–1)
BUN BLDV-MCNC: 18 MG/DL (ref 7–20)
CALCIUM SERPL-MCNC: 9.2 MG/DL (ref 8.3–10.6)
CHLORIDE BLD-SCNC: 101 MMOL/L (ref 99–110)
CO2: 25 MMOL/L (ref 21–32)
CREAT SERPL-MCNC: 0.6 MG/DL (ref 0.6–1.1)
GFR AFRICAN AMERICAN: >60
GFR NON-AFRICAN AMERICAN: >60
GLOBULIN: 2.6 G/DL
GLUCOSE BLD-MCNC: 93 MG/DL (ref 70–99)
POTASSIUM SERPL-SCNC: 3.7 MMOL/L (ref 3.5–5.1)
SODIUM BLD-SCNC: 138 MMOL/L (ref 136–145)
TOTAL PROTEIN: 6.8 G/DL (ref 6.4–8.2)
TSH SERPL DL<=0.05 MIU/L-ACNC: 0.37 UIU/ML (ref 0.27–4.2)

## 2021-02-20 LAB — VITAMIN D 25-HYDROXY: 54.3 NG/ML

## 2021-02-22 ENCOUNTER — VIRTUAL VISIT (OUTPATIENT)
Dept: ENDOCRINOLOGY | Age: 51
End: 2021-02-22
Payer: COMMERCIAL

## 2021-02-22 DIAGNOSIS — E66.09 CLASS 1 OBESITY DUE TO EXCESS CALORIES WITHOUT SERIOUS COMORBIDITY WITH BODY MASS INDEX (BMI) OF 30.0 TO 30.9 IN ADULT: ICD-10-CM

## 2021-02-22 DIAGNOSIS — E03.8 HYPOTHYROIDISM DUE TO HASHIMOTO'S THYROIDITIS: Primary | ICD-10-CM

## 2021-02-22 DIAGNOSIS — E06.3 HYPOTHYROIDISM DUE TO HASHIMOTO'S THYROIDITIS: Primary | ICD-10-CM

## 2021-02-22 PROCEDURE — 99442 PR PHYS/QHP TELEPHONE EVALUATION 11-20 MIN: CPT | Performed by: INTERNAL MEDICINE

## 2021-02-22 NOTE — PROGRESS NOTES
SUBJECTIVE:    Pt seen for hypothyroidism and obesity  Nae Cabrera had rt Thyroid lobectomy at age 26 years which ended up being benign. Pt stays tired and fatigued -- she has a son who needed to be cathetrized every 4 hrs so she goes to bed at 11.30 and wakes up to 6-7 hours   she has Depression and stress and was started on Zoloft But now doesn't take it any more    She took Contrave for 3 months with no wt loss and was very frustrated about that   She took  Adipex from  January 2017---nov 2017  she  lost almost approximately 20 lbs since December 2016   She had tried Contrave and didn't lose any weight     We resumed Adipex in jan 2018  Lost additional 10 lbs by May 2018. Stopped Adipex in May 2018. She  gained 24 pounds after stopping adipex from may to April 2019     History of radiation to patient's neck: No  Resent iodine exposure: No   Family history includes no thyroid abnormalities. Family history of thyroid cancer: No      She is on Adipex for 5 months it was clearly discussed with patient -Due to their side effects and potential for abuse, adipex use has a limit to short-term (?12 weeks) use. Pt denies any adverse effects include increase in heart rate, blood pressure, insomnia, dry mouth, constipation, nervousness. Pt doesn't have heart disease, poorly controlled hypertension, pulmonary hypertension, or history of addiction or drug abuse  Pt doesn't have a history of CVD, hyperthyroidism, glaucoma, MAO inhibitor-therapy, agitated states, pregnancy, or breast feeding.   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 No pertinent surgical history. Family History   Problem Relation Age of Onset    Cancer Mother     Cancer Father      Social History     Socioeconomic History    Marital status:      Spouse name: None    Number of children: None    Years of education: None    Highest education level: None   Occupational History    None   Social Needs    Financial resource strain: None    Food insecurity     Worry: None     Inability: None    Transportation needs     Medical: None     Non-medical: None   Tobacco Use    Smoking status: Never Smoker    Smokeless tobacco: Never Used   Substance and Sexual Activity    Alcohol use: Yes     Comment: sometime    Drug use: No    Sexual activity: None   Lifestyle    Physical activity     Days per week: None     Minutes per session: None    Stress: None   Relationships    Social connections     Talks on phone: None     Gets together: None     Attends [employer] service: None     Active member of club or organization: None     Attends meetings of clubs or organizations: None     Relationship status: None    Intimate partner violence     Fear of current or ex partner: None     Emotionally abused: None     Physically abused: None     Forced sexual activity: None   Other Topics Concern    None   Social History Narrative    None     Current Outpatient Medications   Medication Sig Dispense Refill    ARMOUR THYROID 90 MG tablet Take 1 tablet by mouth daily.  30 tablet 3    levonorgestrel (MIRENA) IUD 52 mg by Intrauterine route once       No current facility-administered medications for this visit. Allergies   Allergen Reactions    Amoxicillin Rash    Penicillins Rash    Sulfa Antibiotics Rash         Review of Systems:  Constitutional  Patient denies any weight loss denies any weight gain,  Eyes   Pt denies any double vision blurred vision or decreased peripheral vision  Head ear nose throat  Denies any trouble swallowing denies any hoarseness  Denies any shortness of breath denies  Cardiovascular  Denies any chest pain denies any palpitations currently  Gastrointestinal  Denies any nausea ,vomiting ,constipation or diarrhea  Hematological/lymphatic  Denies any blood clot denies or any painful lymph nodes  Genitourinary  Denies any frequent urination denies any nighttime urination  Skin  Denies any acne denies any changes in facial body hair denies any non healing wounds Musculoskeletal   denies any joint or bone pain ,denies any muscle weakness ,denies any new fracture  Endocrine  Denies any excessive thirst denies any excessive heat intolerance or cold intolerance . OBJECTIVE:   There were no vitals taken for this visit. Wt Readings from Last 3 Encounters:   05/15/20 130 lb (59 kg)   01/27/20 137 lb (62.1 kg)   09/11/19 146 lb 6.4 oz (66.4 kg)       Physical Exam:  Weight 126 lbs  -- 130   Patient is  alert oriented to time ,place and person. Both recent and remote memory intact . Patient has weighed themselves in the last few  weeks and it has been stable       Lab Review:  Lab Results   Component Value Date    TSH 0.37 02/19/2021    TSH 1.16 08/04/2020    TSH 0.85 09/06/2019     No results found for: FREET4     ASSESSMENT/PLAN:    ---.  Hypothyroidism due to Hashimoto's thyroiditis  She has been on [place] 90 mg  for years now ---she was on synthroid prior to that   Her TSH was 7.4 (8/2017)>> 0.24 (11/2017) >>1.16 in aug 2020 >> 0.3 in February 2021  No change in dose today     Thyroid usg revealed normal appearing thyroid lobes in feb 2012     RT partial lobectomy in 1991 with benign results     --- Class 1 obesity due to excess calories without serious comorbidity in adult, unspecified BMI  She  lost  27 lbs since starting Adipex 173>>146>>130>>126 lb in nov 2020   Stopped adipex in sept/oct 2019   Started Qsymia in oct 2019 ---stopped nov 2020 she has not gained any weight   Weight was stable over the last few months, she has been watching her diet and trying to exercise daily    She has plateued weight she was advised that it is not FDA approved to take the medication were prolonged period of time    She took Adipex daily from  January 2017 -- nov 2017 and then again from [address] 2018--may 2018   She lost almost 34 lbs  in 2017 on adipex but was watching her diet too --and another 10 lbs in 2018   She tried contrave from september to December 2016 ---no weight loss         --- Anxiety /DEPRESSION   She feels a whole lot better and is not having anxiety anymore.    -was prescribed  Zoloft in the past ---she never took   Feels improved   and does not feel she needs medication anymore     ---Rt breast mass ---it turned out to be benign   She had mammogram June 2016     ---TiREDNESS AND FATIGUE wt gain   Vit d level have improved she is advised to increase vitamin d for winter months   she is not anemic   --tiredness and fatigue have improved since she lost weight and is on Adipex  Specially when she is on Adipex she notes significant improvement in her fatigue      Reviewed and/or ordered clinical lab results Yes  Reviewed and/or ordered radiology tests Yes   Reviewed and/or ordered other diagnostic tests Yes  Made a decision to obtain old records Yes  Reviewed and summarized old records Yes      Bryant Davis was counseled regarding symptoms of current diagnosis, course and complications of disease if inadequately treated, side effects of medications, diagnosis, treatment options, and prognosis, risks, benefits, complications, and alternatives of treatment, labs, imaging and other studies and treatment targets and goals. She understands instructions and counseling. This was a phone conversation in [place] of in-person visit due to coronavirus emergency. Patient provided verbal consent for an \"over the phone visit'. Location of patient; home  Total time spent 18  minutes on this call conducting an interview, collecting data and reviewing her chart, performing a limited exam by phone and educating the patient on my assessment and plan. Return in about 3 months (around 5/22/2021).

## 2021-09-27 ENCOUNTER — VIRTUAL VISIT (OUTPATIENT)
Dept: ENDOCRINOLOGY | Age: 51
End: 2021-09-27
Payer: COMMERCIAL

## 2021-09-27 DIAGNOSIS — E06.3 HYPOTHYROIDISM DUE TO HASHIMOTO'S THYROIDITIS: ICD-10-CM

## 2021-09-27 DIAGNOSIS — E66.9 OBESITY (BMI 30-39.9): ICD-10-CM

## 2021-09-27 DIAGNOSIS — E03.8 HYPOTHYROIDISM DUE TO HASHIMOTO'S THYROIDITIS: ICD-10-CM

## 2021-09-27 DIAGNOSIS — E55.9 VITAMIN D DEFICIENCY: Primary | ICD-10-CM

## 2021-09-27 PROCEDURE — 99442 PR PHYS/QHP TELEPHONE EVALUATION 11-20 MIN: CPT | Performed by: INTERNAL MEDICINE

## 2021-09-27 RX ORDER — THYROID,PORK 90 MG
TABLET ORAL
Qty: 30 TABLET | Refills: 5 | Status: SHIPPED | OUTPATIENT
Start: 2021-09-27 | End: 2022-04-19

## 2021-09-27 NOTE — PROGRESS NOTES
List    Diagnosis Date Noted    Hypothyroidism due to Hashimoto's thyroiditis 01/17/2018    Class 1 obesity due to excess calories without serious comorbidity with body mass index (BMI) of 30.0 to 30.9 in adult 01/17/2018    Anxiety 01/17/2018     History reviewed. No pertinent surgical history. Family History   Problem Relation Age of Onset    Cancer Mother     Cancer Father      Social History     Socioeconomic History    Marital status:      Spouse name: None    Number of children: None    Years of education: None    Highest education level: None   Occupational History    None   Tobacco Use    Smoking status: Never Smoker    Smokeless tobacco: Never Used   Vaping Use    Vaping Use: Never used   Substance and Sexual Activity    Alcohol use: Yes     Comment: sometime    Drug use: No    Sexual activity: None   Other Topics Concern    None   Social History Narrative    None     Social Determinants of Health     Financial Resource Strain:     Difficulty of Paying Living Expenses:    Food Insecurity:     Worried About Running Out of Food in the Last Year:     Ran Out of Food in the Last Year:    Transportation Needs:     Lack of Transportation (Medical):      Lack of Transportation (Non-Medical):    Physical Activity:     Days of Exercise per Week:     Minutes of Exercise per Session:    Stress:     Feeling of Stress :    Social Connections:     Frequency of Communication with Friends and Family:     Frequency of Social Gatherings with Friends and Family:     Attends Confucianism Services:     Active Member of Clubs or Organizations:     Attends Club or Organization Meetings:     Marital Status:    Intimate Partner Violence:     Fear of Current or Ex-Partner:     Emotionally Abused:     Physically Abused:     Sexually Abused:      Current Outpatient Medications   Medication Sig Dispense Refill    ARMOUR THYROID 90 MG tablet TAKE 1 TABLET BY MOUTH DAILY 30 tablet 5    levonorgestrel (MIRENA) IUD 52 mg by Intrauterine route once       No current facility-administered medications for this visit. Allergies   Allergen Reactions    Amoxicillin Rash    Penicillins Rash    Sulfa Antibiotics Rash       OBJECTIVE:   There were no vitals taken for this visit. Wt Readings from Last 3 Encounters:   05/15/20 130 lb (59 kg)   01/27/20 137 lb (62.1 kg)   09/11/19 146 lb 6.4 oz (66.4 kg)       Physical Exam:  Weight 126 lbs  -- 130   Patient is  alert oriented to time ,place and person. Both recent and remote memory intact . Patient has weighed themselves in the last few  weeks and it has been stable       Lab Review:  Lab Results   Component Value Date    TSH 0.37 02/19/2021    TSH 1.16 08/04/2020    TSH 0.85 09/06/2019     No results found for: FREET4     ASSESSMENT/PLAN:    ---.  Hypothyroidism due to Hashimoto's thyroiditis  She has been on [place] 90 mg  for years now ---she was on synthroid prior to that   Her TSH was 7.4 (8/2017)>> 0.24 (11/2017) >>1.16 in aug 2020 >> 0.3 in February 2021  No change in dose today     Thyroid usg revealed normal appearing thyroid lobes in feb 2012     RT partial lobectomy in 1991 with benign results     --- Class 1 obesity due to excess calories without serious comorbidity in adult, unspecified BMI  She  lost  27 lbs since starting Adipex 173>>146>>130>>126 lb in nov 2020   Stopped adipex in sept/oct 2019   Started Qsymia in oct 2019 ---stopped nov 2020 she has not gained any weight   Weight was stable over the last few months, she has been watching her diet and trying to exercise daily    She has plateued weight she was advised that it is not FDA approved to take the medication were prolonged period of time    She took Adipex daily from  January 2017 -- nov 2017 and then again from [address] 2018--may 2018   She lost almost 34 lbs  in 2017 on adipex but was watching her diet too --and another 10 lbs in 2018   She tried contrave from september to

## 2021-12-20 DIAGNOSIS — E55.9 VITAMIN D DEFICIENCY: ICD-10-CM

## 2021-12-20 DIAGNOSIS — E03.8 HYPOTHYROIDISM DUE TO HASHIMOTO'S THYROIDITIS: ICD-10-CM

## 2021-12-20 DIAGNOSIS — E06.3 HYPOTHYROIDISM DUE TO HASHIMOTO'S THYROIDITIS: ICD-10-CM

## 2021-12-20 LAB
A/G RATIO: 1.8 (ref 1.1–2.2)
ALBUMIN SERPL-MCNC: 3.8 G/DL (ref 3.4–5)
ALP BLD-CCNC: 56 U/L (ref 40–129)
ALT SERPL-CCNC: 20 U/L (ref 10–40)
ANION GAP SERPL CALCULATED.3IONS-SCNC: 7 MMOL/L (ref 3–16)
AST SERPL-CCNC: 24 U/L (ref 15–37)
BILIRUB SERPL-MCNC: 0.3 MG/DL (ref 0–1)
BUN BLDV-MCNC: 17 MG/DL (ref 7–20)
CALCIUM SERPL-MCNC: 9.2 MG/DL (ref 8.3–10.6)
CHLORIDE BLD-SCNC: 102 MMOL/L (ref 99–110)
CHOLESTEROL, TOTAL: 166 MG/DL (ref 0–199)
CO2: 28 MMOL/L (ref 21–32)
CREAT SERPL-MCNC: 0.6 MG/DL (ref 0.6–1.1)
GFR AFRICAN AMERICAN: >60
GFR NON-AFRICAN AMERICAN: >60
GLUCOSE BLD-MCNC: 88 MG/DL (ref 70–99)
HDLC SERPL-MCNC: 65 MG/DL (ref 40–60)
LDL CHOLESTEROL CALCULATED: 80 MG/DL
POTASSIUM SERPL-SCNC: 3.9 MMOL/L (ref 3.5–5.1)
SODIUM BLD-SCNC: 137 MMOL/L (ref 136–145)
T3 TOTAL: 1.28 NG/ML (ref 0.8–2)
TOTAL PROTEIN: 5.9 G/DL (ref 6.4–8.2)
TRIGL SERPL-MCNC: 104 MG/DL (ref 0–150)
TSH SERPL DL<=0.05 MIU/L-ACNC: 1.04 UIU/ML (ref 0.27–4.2)
VITAMIN D 25-HYDROXY: 40.5 NG/ML
VLDLC SERPL CALC-MCNC: 21 MG/DL

## 2022-01-03 DIAGNOSIS — E06.3 HYPOTHYROIDISM DUE TO HASHIMOTO'S THYROIDITIS: Primary | ICD-10-CM

## 2022-01-03 DIAGNOSIS — E03.8 HYPOTHYROIDISM DUE TO HASHIMOTO'S THYROIDITIS: Primary | ICD-10-CM

## 2022-01-03 DIAGNOSIS — E66.9 OBESITY (BMI 30-39.9): ICD-10-CM

## 2022-04-19 DIAGNOSIS — E55.9 VITAMIN D DEFICIENCY: ICD-10-CM

## 2022-04-19 DIAGNOSIS — E03.8 HYPOTHYROIDISM DUE TO HASHIMOTO'S THYROIDITIS: ICD-10-CM

## 2022-04-19 DIAGNOSIS — E06.3 HYPOTHYROIDISM DUE TO HASHIMOTO'S THYROIDITIS: ICD-10-CM

## 2022-04-19 RX ORDER — THYROID,PORK 90 MG
TABLET ORAL
Qty: 30 TABLET | Refills: 4 | Status: SHIPPED | OUTPATIENT
Start: 2022-04-19 | End: 2022-07-25 | Stop reason: SDUPTHER

## 2022-07-22 DIAGNOSIS — E03.8 HYPOTHYROIDISM DUE TO HASHIMOTO'S THYROIDITIS: ICD-10-CM

## 2022-07-22 DIAGNOSIS — E06.3 HYPOTHYROIDISM DUE TO HASHIMOTO'S THYROIDITIS: ICD-10-CM

## 2022-07-22 DIAGNOSIS — E66.9 OBESITY (BMI 30-39.9): ICD-10-CM

## 2022-07-22 LAB
A/G RATIO: 2.2 (ref 1.1–2.2)
ALBUMIN SERPL-MCNC: 4.6 G/DL (ref 3.4–5)
ALP BLD-CCNC: 79 U/L (ref 40–129)
ALT SERPL-CCNC: 17 U/L (ref 10–40)
ANION GAP SERPL CALCULATED.3IONS-SCNC: 11 MMOL/L (ref 3–16)
AST SERPL-CCNC: 25 U/L (ref 15–37)
BILIRUB SERPL-MCNC: 0.3 MG/DL (ref 0–1)
BUN BLDV-MCNC: 19 MG/DL (ref 7–20)
CALCIUM SERPL-MCNC: 9.4 MG/DL (ref 8.3–10.6)
CHLORIDE BLD-SCNC: 106 MMOL/L (ref 99–110)
CHOLESTEROL, TOTAL: 167 MG/DL (ref 0–199)
CO2: 25 MMOL/L (ref 21–32)
CREAT SERPL-MCNC: 0.7 MG/DL (ref 0.6–1.1)
GFR AFRICAN AMERICAN: >60
GFR NON-AFRICAN AMERICAN: >60
GLUCOSE BLD-MCNC: 111 MG/DL (ref 70–99)
HDLC SERPL-MCNC: 72 MG/DL (ref 40–60)
LDL CHOLESTEROL CALCULATED: 86 MG/DL
POTASSIUM SERPL-SCNC: 4.8 MMOL/L (ref 3.5–5.1)
SODIUM BLD-SCNC: 142 MMOL/L (ref 136–145)
TOTAL PROTEIN: 6.7 G/DL (ref 6.4–8.2)
TRIGL SERPL-MCNC: 43 MG/DL (ref 0–150)
TSH SERPL DL<=0.05 MIU/L-ACNC: 1.31 UIU/ML (ref 0.27–4.2)
VITAMIN D 25-HYDROXY: 70.9 NG/ML
VLDLC SERPL CALC-MCNC: 9 MG/DL

## 2022-07-25 ENCOUNTER — OFFICE VISIT (OUTPATIENT)
Dept: ENDOCRINOLOGY | Age: 52
End: 2022-07-25
Payer: COMMERCIAL

## 2022-07-25 ENCOUNTER — TELEPHONE (OUTPATIENT)
Dept: ENDOCRINOLOGY | Age: 52
End: 2022-07-25

## 2022-07-25 VITALS
WEIGHT: 168 LBS | HEIGHT: 62 IN | DIASTOLIC BLOOD PRESSURE: 70 MMHG | HEART RATE: 57 BPM | SYSTOLIC BLOOD PRESSURE: 117 MMHG | RESPIRATION RATE: 16 BRPM | BODY MASS INDEX: 30.91 KG/M2

## 2022-07-25 DIAGNOSIS — E55.9 VITAMIN D DEFICIENCY: ICD-10-CM

## 2022-07-25 DIAGNOSIS — E03.8 HYPOTHYROIDISM DUE TO HASHIMOTO'S THYROIDITIS: ICD-10-CM

## 2022-07-25 DIAGNOSIS — E06.3 HYPOTHYROIDISM DUE TO HASHIMOTO'S THYROIDITIS: ICD-10-CM

## 2022-07-25 PROCEDURE — 99214 OFFICE O/P EST MOD 30 MIN: CPT | Performed by: INTERNAL MEDICINE

## 2022-07-25 RX ORDER — PHENTERMINE AND TOPIRAMATE 7.5; 46 MG/1; MG/1
CAPSULE, EXTENDED RELEASE ORAL
Qty: 30 CAPSULE | Refills: 1 | Status: SHIPPED | OUTPATIENT
Start: 2022-07-25 | End: 2022-10-05 | Stop reason: SDUPTHER

## 2022-07-25 RX ORDER — PHENTERMINE AND TOPIRAMATE 7.5; 46 MG/1; MG/1
CAPSULE, EXTENDED RELEASE ORAL
Qty: 30 CAPSULE | Refills: 1 | Status: SHIPPED | OUTPATIENT
Start: 2022-07-25 | End: 2022-07-25 | Stop reason: SDUPTHER

## 2022-07-25 RX ORDER — PHENTERMINE AND TOPIRAMATE 3.75; 23 MG/1; MG/1
CAPSULE, EXTENDED RELEASE ORAL
Qty: 14 CAPSULE | Refills: 3 | Status: SHIPPED | OUTPATIENT
Start: 2022-07-25 | End: 2022-07-25 | Stop reason: SDUPTHER

## 2022-07-25 RX ORDER — THYROID,PORK 90 MG
TABLET ORAL
Qty: 30 TABLET | Refills: 5 | Status: SHIPPED | OUTPATIENT
Start: 2022-07-25

## 2022-07-25 RX ORDER — PHENTERMINE AND TOPIRAMATE 3.75; 23 MG/1; MG/1
CAPSULE, EXTENDED RELEASE ORAL
Qty: 14 CAPSULE | Refills: 0 | Status: SHIPPED | OUTPATIENT
Start: 2022-07-25 | End: 2022-09-02

## 2022-07-25 NOTE — TELEPHONE ENCOUNTER
Pt states issues with insurance covering these medications. Would like to speak to Arie.   Phentermine-Topiramate (QSYMIA) 3.75-23 MG CP24   Phentermine-Topiramate (QSYMIA) 7.5-46 MG CP24

## 2022-07-25 NOTE — TELEPHONE ENCOUNTER
Spoke to patient and explained how the [facility] works with the [address] [place] [address] [address]. Patient made aware the prescription is $98 plus shipping. I explained that she will need to make an account at [email or web address] and the prescription will be faxed and processed to her account within 1-3 business days. Patient verbalized understanding. No other questions or concerns at this time.

## 2022-07-25 NOTE — PROGRESS NOTES
SUBJECTIVE:    Pt seen for hypothyroidism and obesity  Coral Hester had rt Thyroid lobectomy at age 26 years which ended up being benign. Pt stays tired and fatigued -- she has a son who needed to be cathetrized every 4 hrs so she goes to bed at 11.30 and wakes up to 6-7 hours   she has Depression and stress and was started on Zoloft But now doesn't take it any more    She took Contrave for 3 months with no wt loss and was very frustrated about that   She took  Adipex from  January 2017---nov 2017  she  lost almost approximately 20 lbs since December 2016   She had tried Contrave and didn't lose any weight     We resumed Adipex in jan 2018  Lost additional 10 lbs by May 2018. Stopped Adipex in May 2018. She  gained 24 pounds after stopping adipex from may to April 2019     History of radiation to patient's neck: No  Resent iodine exposure: No   Family history includes no thyroid abnormalities. Family history of thyroid cancer: No      She is on Adipex for 5 months it was clearly discussed with patient -Due to their side effects and potential for abuse, adipex use has a limit to short-term (?12 weeks) use. Pt denies any adverse effects include increase in heart rate, blood pressure, insomnia, dry mouth, constipation, nervousness. Pt doesn't have heart disease, poorly controlled hypertension, pulmonary hypertension, or history of addiction or drug abuse  Pt doesn't have a history of CVD, hyperthyroidism, glaucoma, MAO inhibitor-therapy, agitated states, pregnancy, or breast feeding. She stopped  Adipex --- October 2019     INTERIM     She is not exercising as much. She has gained weight.   Feels frustrated about the weight gain but admits that she is not doing as much effort    Past Medical History:   Diagnosis Date    Anxiety 1/17/2018    Class 1 obesity due to excess calories without serious comorbidity with body mass index (BMI) of 30.0 to 30.9 in adult 1/17/2018    Hypothyroidism     Hypothyroidism due to Hashimoto's thyroiditis 1/17/2018     Patient Active Problem List    Diagnosis Date Noted    Hypothyroidism due to Hashimoto's thyroiditis 01/17/2018    Class 1 obesity due to excess calories without serious comorbidity with body mass index (BMI) of 30.0 to 30.9 in adult 01/17/2018    Anxiety 01/17/2018     History reviewed. No pertinent surgical history. Family History   Problem Relation Age of Onset    Cancer Mother     Cancer Father      Social History     Socioeconomic History    Marital status:      Spouse name: None    Number of children: None    Years of education: None    Highest education level: None   Tobacco Use    Smoking status: Never    Smokeless tobacco: Never   Vaping Use    Vaping Use: Never used   Substance and Sexual Activity    Alcohol use: Yes     Comment: sometime    Drug use: No     Current Outpatient Medications   Medication Sig Dispense Refill    ARMOUR THYROID 90 MG tablet TAKE 1 TABLET BY MOUTH DAILY 30 tablet 5    levonorgestrel (MIRENA) IUD 52 mg by Intrauterine route once      Phentermine-Topiramate (QSYMIA) 7.5-46 MG CP24 1 cap daily 30 capsule 1    Phentermine-Topiramate (QSYMIA) 3.75-23 MG CP24 Take one cap daily 14 capsule 0     No current facility-administered medications for this visit. Allergies   Allergen Reactions    Amoxicillin Rash    Penicillins Rash    Sulfa Antibiotics Rash       OBJECTIVE:   /70   Pulse 57   Resp 16   Ht 5' 2\" (1.575 m)   Wt 168 lb (76.2 kg)   BMI 30.73 kg/m²   Wt Readings from Last 3 Encounters:   07/25/22 168 lb (76.2 kg)   05/15/20 130 lb (59 kg)   01/27/20 137 lb (62.1 kg)       Physical Exam:  Weight 126 lbs  -- 130   Patient is  alert oriented to time ,place and person. Both recent and remote memory intact .   Patient has weighed themselves in the last few  weeks and it has been stable       Lab Review:  Lab Results   Component Value Date/Time    TSH 1.31 07/22/2022 08:28 AM    TSH 1.04 12/20/2021 11:24 AM    TSH 0.37 02/19/2021 03:17 PM     No results found for: FREET4     ASSESSMENT/PLAN:    ---.  Hypothyroidism due to Hashimoto's thyroiditis  She has been on Neeses 90 mg  for years now ---  Thyroid hormone levels are within normal limits  Thyroid usg revealed normal appearing thyroid lobes in feb 2012     RT partial lobectomy in 1991 with benign results     --- Class 1 obesity due to excess calories without serious comorbidity in adult, unspecified BMI  Restart Qsymia prescription sent patient aware of all possible side effects    She  lost  27 lbs since starting Adipex 173>>146>>130>>126 lb in nov 2020   Stopped adipex in sept/oct 2019   Started Qsymia in oct 2019 ---stopped nov 2020 she has not gained any weight   Weight was stable over the last few months, she has been watching her diet and trying to exercise daily    She has plateued weight she was advised that it is not FDA approved to take the medication were prolonged period of time    She took Adipex daily from  January 2017 -- nov 2017 and then again from [address] 2018--may 2018   She lost almost 34 lbs  in 2017 on adipex but was watching her diet too --and another 10 lbs in 2018   She tried contrave from september to December 2016 ---no weight loss     Glucose value of 111 noted on the blood work but she did drink coffee with creamer and sugar that morning we will repeat fasting labs at follow-up      --- Anxiety /DEPRESSION   She feels a whole lot better and is not having anxiety anymore.    -was prescribed  Zoloft in the past ---she never took   Feels improved   and does not feel she needs medication anymore     ---Rt breast mass ---it turned out to be benign   She had mammogram June 2016     ---TiREDNESS AND FATIGUE wt gain   Vit d level have improved she is advised to increase vitamin d for winter months   she is not anemic   --tiredness and fatigue have improved since she lost weight and is on Adipex  Specially when she is on Adipex she notes significant improvement in her fatigue      Reviewed and/or ordered clinical lab results Yes  Reviewed and/or ordered radiology tests Yes   Reviewed and/or ordered other diagnostic tests Yes  Made a decision to obtain old records Yes  Reviewed and summarized old records Yes      Lakshmi Antony was counseled regarding symptoms of current diagnosis, course and complications of disease if inadequately treated, side effects of medications, diagnosis, treatment options, and prognosis, risks, benefits, complications, and alternatives of treatment, labs, imaging and other studies and treatment targets and goals. She understands instructions and counseling. Return in about 3 months (around 10/25/2022).

## 2022-07-25 NOTE — Clinical Note
[facility]  [address] [facility] [address]  [address] [place] [address]  Phone: [phone]  Fax: [phone]    Alfredo Sawyer MD        July 25, 2022     Patient: Denis Caller   YOB: 1970   Date of Visit: 7/25/2022       To Whom It May Concern: It is my medical opinion that [facility] {Work release (duty restriction):37191}. If you have any questions or concerns, please don't hesitate to call.     Sincerely,        Alfredo Sawyer MD

## 2022-10-04 ENCOUNTER — TELEPHONE (OUTPATIENT)
Dept: ENDOCRINOLOGY | Age: 52
End: 2022-10-04

## 2022-10-04 DIAGNOSIS — E06.3 HYPOTHYROIDISM DUE TO HASHIMOTO'S THYROIDITIS: ICD-10-CM

## 2022-10-04 DIAGNOSIS — E55.9 VITAMIN D DEFICIENCY: ICD-10-CM

## 2022-10-04 DIAGNOSIS — E03.8 HYPOTHYROIDISM DUE TO HASHIMOTO'S THYROIDITIS: ICD-10-CM

## 2022-10-05 RX ORDER — PHENTERMINE AND TOPIRAMATE 7.5; 46 MG/1; MG/1
CAPSULE, EXTENDED RELEASE ORAL
Qty: 30 CAPSULE | Refills: 1 | Status: SHIPPED | OUTPATIENT
Start: 2022-10-05 | End: 2022-12-11

## 2022-11-23 ENCOUNTER — TELEPHONE (OUTPATIENT)
Dept: ENDOCRINOLOGY | Age: 52
End: 2022-11-23

## 2022-11-23 DIAGNOSIS — E55.9 VITAMIN D DEFICIENCY: Primary | ICD-10-CM

## 2022-11-25 DIAGNOSIS — E55.9 VITAMIN D DEFICIENCY: ICD-10-CM

## 2022-11-25 DIAGNOSIS — E03.8 HYPOTHYROIDISM DUE TO HASHIMOTO'S THYROIDITIS: ICD-10-CM

## 2022-11-25 DIAGNOSIS — E06.3 HYPOTHYROIDISM DUE TO HASHIMOTO'S THYROIDITIS: ICD-10-CM

## 2022-11-25 LAB
A/G RATIO: 2 (ref 1.1–2.2)
ALBUMIN SERPL-MCNC: 4.8 G/DL (ref 3.4–5)
ALP BLD-CCNC: 78 U/L (ref 40–129)
ALT SERPL-CCNC: 16 U/L (ref 10–40)
ANION GAP SERPL CALCULATED.3IONS-SCNC: 12 MMOL/L (ref 3–16)
AST SERPL-CCNC: 20 U/L (ref 15–37)
BILIRUB SERPL-MCNC: <0.2 MG/DL (ref 0–1)
BUN BLDV-MCNC: 20 MG/DL (ref 7–20)
CALCIUM SERPL-MCNC: 9.4 MG/DL (ref 8.3–10.6)
CHLORIDE BLD-SCNC: 109 MMOL/L (ref 99–110)
CHOLESTEROL, TOTAL: 169 MG/DL (ref 0–199)
CO2: 22 MMOL/L (ref 21–32)
CREAT SERPL-MCNC: 0.7 MG/DL (ref 0.6–1.1)
GFR SERPL CREATININE-BSD FRML MDRD: >60 ML/MIN/{1.73_M2}
GLUCOSE BLD-MCNC: 115 MG/DL (ref 70–99)
HDLC SERPL-MCNC: 63 MG/DL (ref 40–60)
LDL CHOLESTEROL CALCULATED: 96 MG/DL
POTASSIUM SERPL-SCNC: 4.4 MMOL/L (ref 3.5–5.1)
SODIUM BLD-SCNC: 143 MMOL/L (ref 136–145)
TOTAL PROTEIN: 7.2 G/DL (ref 6.4–8.2)
TRIGL SERPL-MCNC: 52 MG/DL (ref 0–150)
TSH SERPL DL<=0.05 MIU/L-ACNC: 0.89 UIU/ML (ref 0.27–4.2)
VLDLC SERPL CALC-MCNC: 10 MG/DL

## 2022-11-26 LAB
ESTIMATED AVERAGE GLUCOSE: 108.3 MG/DL
HBA1C MFR BLD: 5.4 %

## 2022-11-28 ENCOUNTER — OFFICE VISIT (OUTPATIENT)
Dept: ENDOCRINOLOGY | Age: 52
End: 2022-11-28
Payer: COMMERCIAL

## 2022-11-28 VITALS
SYSTOLIC BLOOD PRESSURE: 139 MMHG | HEIGHT: 62 IN | DIASTOLIC BLOOD PRESSURE: 80 MMHG | BODY MASS INDEX: 29.63 KG/M2 | WEIGHT: 161 LBS | RESPIRATION RATE: 16 BRPM | HEART RATE: 78 BPM

## 2022-11-28 DIAGNOSIS — E55.9 VITAMIN D DEFICIENCY: ICD-10-CM

## 2022-11-28 DIAGNOSIS — E66.9 OBESITY (BMI 30-39.9): ICD-10-CM

## 2022-11-28 DIAGNOSIS — E03.8 HYPOTHYROIDISM DUE TO HASHIMOTO'S THYROIDITIS: Primary | ICD-10-CM

## 2022-11-28 DIAGNOSIS — E06.3 HYPOTHYROIDISM DUE TO HASHIMOTO'S THYROIDITIS: Primary | ICD-10-CM

## 2022-11-28 PROCEDURE — 99214 OFFICE O/P EST MOD 30 MIN: CPT | Performed by: INTERNAL MEDICINE

## 2022-11-28 RX ORDER — PHENTERMINE AND TOPIRAMATE 11.25; 69 MG/1; MG/1
CAPSULE, EXTENDED RELEASE ORAL
Qty: 30 CAPSULE | Refills: 1 | Status: CANCELLED | OUTPATIENT
Start: 2022-11-28 | End: 2023-01-28

## 2022-11-28 RX ORDER — THYROID,PORK 90 MG
TABLET ORAL
Qty: 30 TABLET | Refills: 5 | Status: SHIPPED | OUTPATIENT
Start: 2022-11-28

## 2022-11-28 RX ORDER — PHENTERMINE AND TOPIRAMATE 11.25; 69 MG/1; MG/1
CAPSULE, EXTENDED RELEASE ORAL
Qty: 30 CAPSULE | Refills: 4 | Status: SHIPPED | OUTPATIENT
Start: 2022-11-28 | End: 2023-05-23

## 2022-11-28 NOTE — PROGRESS NOTES
SUBJECTIVE:    Pt seen for hypothyroidism and obesity  Mirza Mehta had rt Thyroid lobectomy at age 26 years which ended up being benign. Pt stays tired and fatigued -- she has a son who needed to be cathetrized every 4 hrs so she goes to bed at 11.30 and wakes up to 6-7 hours   she has Depression and stress and was started on Zoloft But now doesn't take it any more    She took Contrave for 3 months with no wt loss and was very frustrated about that   She took  Adipex from  January 2017---nov 2017  she  lost almost approximately 20 lbs since December 2016   She had tried Contrave and didn't lose any weight     We resumed Adipex in jan 2018  Lost additional 10 lbs by May 2018. Stopped Adipex in May 2018. She  gained 24 pounds after stopping adipex from may to April 2019     History of radiation to patient's neck: No  Resent iodine exposure: No   Family history includes no thyroid abnormalities. Family history of thyroid cancer: No      She is on Adipex for 5 months it was clearly discussed with patient -Due to their side effects and potential for abuse, adipex use has a limit to short-term (?12 weeks) use. Pt denies any adverse effects include increase in heart rate, blood pressure, insomnia, dry mouth, constipation, nervousness. Pt doesn't have heart disease, poorly controlled hypertension, pulmonary hypertension, or history of addiction or drug abuse  Pt doesn't have a history of CVD, hyperthyroidism, glaucoma, MAO inhibitor-therapy, agitated states, pregnancy, or breast feeding. She stopped  Adipex --- October 2019     INTERIM   She feels that the dose of Qsymia is not helping her lose weight. Denies any palpitation or any side effects from Qsymia denies any elevated blood pressure.     Past Medical History:   Diagnosis Date    Anxiety 1/17/2018    Class 1 obesity due to excess calories without serious comorbidity with body mass index (BMI) of 30.0 to 30.9 in adult 1/17/2018    Hypothyroidism Hypothyroidism due to Hashimoto's thyroiditis 1/17/2018     Patient Active Problem List    Diagnosis Date Noted    Hypothyroidism due to Hashimoto's thyroiditis 01/17/2018    Class 1 obesity due to excess calories without serious comorbidity with body mass index (BMI) of 30.0 to 30.9 in adult 01/17/2018    Anxiety 01/17/2018     History reviewed. No pertinent surgical history. Family History   Problem Relation Age of Onset    Cancer Mother     Cancer Father      Social History     Socioeconomic History    Marital status:      Spouse name: None    Number of children: None    Years of education: None    Highest education level: None   Tobacco Use    Smoking status: Never    Smokeless tobacco: Never   Vaping Use    Vaping Use: Never used   Substance and Sexual Activity    Alcohol use: Yes     Comment: sometime    Drug use: No     Current Outpatient Medications   Medication Sig Dispense Refill    ARMOUR THYROID 90 MG tablet TAKE 1 TABLET BY MOUTH DAILY 30 tablet 5    Phentermine-Topiramate (QSYMIA) 11.25-69 MG CP24 Take 1 tab daily 30 capsule 4    levonorgestrel (MIRENA) IUD 52 mg by Intrauterine route once       No current facility-administered medications for this visit. Allergies   Allergen Reactions    Amoxicillin Rash    Penicillins Rash    Sulfa Antibiotics Rash       OBJECTIVE:   /80   Pulse 78   Resp 16   Ht 5' 2\" (1.575 m)   Wt 161 lb (73 kg)   BMI 29.45 kg/m²   Wt Readings from Last 3 Encounters:   11/28/22 161 lb (73 kg)   07/25/22 168 lb (76.2 kg)   05/15/20 130 lb (59 kg)       Physical Exam:  Weight 126 lbs  -- 130   Patient is  alert oriented to time ,place and person. Both recent and remote memory intact .   Patient has weighed themselves in the last few  weeks and it has been stable       Lab Review:  Lab Results   Component Value Date/Time    TSH 0.89 11/25/2022 11:25 AM    TSH 1.31 07/22/2022 08:28 AM    TSH 1.04 12/20/2021 11:24 AM     No results found for: Lexa Oneal ASSESSMENT/PLAN:    --- Hypothyroidism due to Hashimoto's thyroiditis  She has been on Winfred 90 mg  for years now ---  Thyroid hormone levels are within normal limits  Thyroid usg revealed normal appearing thyroid lobes in feb 2012     RT partial lobectomy in 1991 with benign results     --- Class 1 obesity due to excess calories without serious comorbidity in adult, unspecified BMI  --Started Qsymia again in July 2022  Increase the dose of Qsymia 11.25/69 mg capsules daily.     We also discussed GLP GLP combinations for future use  She  previously she lost  27 lbs on Adipex 173>>146>>130>>126 lb in nov 2020   Stopped adipex in sept/oct 2019   Started Qsymia in oct 2019 ---stopped nov 2020 she has not gained any weight   Weight was stable over the last few months, she has been watching her diet and trying to exercise daily    She took Adipex daily from  January 2017 -- nov 2017 and then again from jan 2018--may 2018   She lost almost 34 lbs  in 2017 on adipex but was watching her diet too --and another 10 lbs in 2018   She tried contrave from september to December 2016 ---no weight loss     Glucose value of 111 noted on the blood work but she did drink coffee with creamer and sugar that morning we will repeat fasting labs at follow-up      --- Anxiety /DEPRESSION   She feels a whole lot better and is not having anxiety anymore.    -was prescribed  Zoloft in the past ---she never took   Feels improved   and does not feel she needs medication anymore     ---Rt breast mass ---it turned out to be benign   Follows with the breast clinic at [facility]    ---TiREDNESS AND FATIGUE wt gain   Vit d level have improved she is advised to increase vitamin d for winter months   she is not anemic   --tiredness and fatigue have improved since she lost weight and is on Adipex  Specially when she is on Adipex she notes significant improvement in her fatigue      Reviewed and/or ordered clinical lab results Yes  Reviewed and/or ordered radiology tests Yes   Reviewed and/or ordered other diagnostic tests Yes  Made a decision to obtain old records Yes  Reviewed and summarized old records Yes      Ramana Boyer was counseled regarding symptoms of current diagnosis, course and complications of disease if inadequately treated, side effects of medications, diagnosis, treatment options, and prognosis, risks, benefits, complications, and alternatives of treatment, labs, imaging and other studies and treatment targets and goals. She understands instructions and counseling. Return in about 6 months (around 5/28/2023).

## 2022-11-28 NOTE — PATIENT INSTRUCTIONS
Tirzepatide: Patient drug information      Copyright 4779-9294 [employer] 240 rights reserved. Contributor Disclosures  (For additional information see \"Tirzepatide: Drug information\")    You must carefully read the \"Consumer Information Use and Disclaimer\" below in order to understand and correctly use this information. Brand Names: [place]  Mounjaro    Warning   This drug has been shown to cause thyroid cancer in some animals. It is not known if this happens in humans. If thyroid cancer happens, it may be deadly if not found and treated early. Call your doctor right away if you have a neck mass, trouble breathing, trouble swallowing, or have hoarseness that will not go away. Do not use this drug if you have a health problem called Multiple Endocrine Neoplasia syndrome type 2 (MEN 2), or if you or a family member have had thyroid cancer. Have your blood work checked and thyroid ultrasounds as you have been told by your doctor. What is this drug used for? It is used to lower blood sugar in patients with high blood sugar (diabetes). What do I need to tell my doctor BEFORE I take this drug? If you are allergic to this drug; any part of this drug; or any other drugs, foods, or substances. Tell your doctor about the allergy and what signs you had. If you have type 1 diabetes. Do not use this drug to treat type 1 diabetes. If you have ever had pancreatitis. If you have stomach or bowel problems. This is not a list of all drugs or health problems that interact with this drug. Tell your doctor and pharmacist about all of your drugs (prescription or OTC, natural products, vitamins) and health problems. You must check to make sure that it is safe for you to take this drug with all of your drugs and health problems. Do not start, stop, or change the dose of any drug without checking with your doctor. What are some things I need to know or do while I take this drug?    Tell all of your health care providers that you take this drug. This includes your doctors, nurses, pharmacists, and dentists. Wear disease medical alert ID (identification). Follow the diet and workout plan that your doctor told you about. Check your blood sugar as you have been told by your doctor. Do not drive if your blood sugar has been low. There is a greater chance of you having a crash. Birth control pills may not work as well to prevent pregnancy. If you take birth control pills, you may need to switch to another type of hormone-based birth control like a vaginal ring if your doctor tells you to. If another type of hormone-based birth control is not an option, use some other kind of birth control also, like a condom. Do this for 4 weeks after starting this drug and for 4 weeks each time the dose is raised. This drug may prevent other drugs taken by mouth from getting into the body. If you take other drugs by mouth, you may need to take them at some other time than this drug. Talk with your doctor. It may be harder to control blood sugar during times of stress such as fever, infection, injury, or surgery. A change in physical activity, exercise, or diet may also affect blood sugar. Talk with your doctor before you drink alcohol. Do not share with another person even if the needle has been changed. Sharing your tray or pen may pass infections from one person to another. This includes infections you may not know you have. If you cannot drink liquids by mouth or if you have upset stomach, throwing up, or diarrhea that does not go away; you need to avoid getting dehydrated. Contact your doctor to find out what to do. Dehydration may lead to new or worse kidney problems. A severe and sometimes deadly pancreas problem (pancreatitis) has happened with other drugs like this one. Tell your doctor if you are pregnant, plan on getting pregnant, or are breast-feeding.  You will need to talk about the benefits and risks to you and the baby. What are some side effects that I need to call my doctor about right away? WARNING/CAUTION: Even though it may be rare, some people may have very bad and sometimes deadly side effects when taking a drug. Tell your doctor or get medical help right away if you have any of the following signs or symptoms that may be related to a very bad side effect:   Signs of an allergic reaction, like rash; hives; itching; red, swollen, blistered, or peeling skin with or without fever; wheezing; tightness in the chest or throat; trouble breathing, swallowing, or talking; unusual hoarseness; or swelling of the mouth, face, lips, tongue, or throat. Signs of kidney problems like unable to pass urine, change in how much urine is passed, blood in the urine, or a big weight gain. Signs of gallbladder problems like pain in the upper right belly area, right shoulder area, or between the shoulder blades; yellow skin or eyes; fever with chills; bloating; or very upset stomach or throwing up. Signs of a pancreas problem (pancreatitis) like very bad stomach pain, very bad back pain, or very bad upset stomach or throwing up. Dizziness or passing out. A fast heartbeat. Change in eyesight. Low blood sugar can happen. The chance may be raised when this drug is used with other drugs for diabetes. Signs may be dizziness, headache, feeling sleepy or weak, shaking, fast heartbeat, confusion, hunger, or sweating. Call your doctor right away if you have any of these signs. Follow what you have been told to do for low blood sugar. This may include taking glucose tablets, liquid glucose, or some fruit juices. What are some other side effects of this drug? All drugs may cause side effects. However, many people have no side effects or only have minor side effects.  Call your doctor or get medical help if any of these side effects or any other side effects bother you or do not go away:   Constipation, diarrhea, stomach pain, upset stomach, throwing up, or feeling less hungry. Heartburn. These are not all of the side effects that may occur. If you have questions about side effects, call your doctor. Call your doctor for medical advice about side effects. You may report side effects to your national health agency. How is this drug best taken? Use this drug as ordered by your doctor. Read all information given to you. Follow all instructions closely. It is given as a shot into the fatty part of the skin on the top of the thigh, belly area, or upper arm. If you will be giving yourself the shot, your doctor or nurse will teach you how to give the shot. Keep taking this drug as you have been told by your doctor or other health care provider, even if you feel well. Take the same day each week. Move site where you give the shot each time. Take with or without food. Wash your hands before and after use. Do not use if the solution is leaking or has particles. This drug is colorless to a faint yellow. Do not use if the solution changes color. If you are also using insulin, you may inject this drug and the insulin in the same area of the body but not right next to each other. Do not mix this drug in the same syringe with insulin. Do not move this drug from the pen to a syringe. Each pen is for one use only. Throw away any part of the used pen after the dose is given. Throw away needles in a needle/sharp disposal box. Do not reuse needles or other items. When the box is full, follow all local rules for getting rid of it. Talk with a doctor or pharmacist if you have any questions. What do I do if I miss a dose? If it is within 4 days after the missed dose, take the missed dose and go back to your normal day. If it has been more than 4 days since the missed dose, skip the missed dose and go back to your normal day. Do not take 2 doses at the same time or extra doses.     How do I store and/or throw out this drug? Store in a refrigerator. Do not freeze. Do not use if it has been frozen. If needed, each pen may be stored at room temperature for up to 21 days. If you store at room temperature, throw away any part not used after 21 days. Protect from heat. Store in the original container to protect from light. Keep all drugs in a safe place. Keep all drugs out of the reach of children and pets. Throw away unused or  drugs. Do not flush down a toilet or pour down a drain unless you are told to do so. Check with your pharmacist if you have questions about the best way to throw out drugs. There may be drug take-back programs in your area. General drug facts   If your symptoms or health problems do not get better or if they become worse, call your doctor. Do not share your drugs with others and do not take anyone else's drugs. Some drugs may have another patient information leaflet. If you have any questions about this drug, please talk with your doctor, nurse, pharmacist, or other health care provider. If you think there has been an overdose, call your poison control center or get medical care right away. Be ready to tell or show what was taken, how much, and when it happened. Last Reviewed [id number]  Consumer Information Use and Disclaimer   This generalized information is a limited summary of diagnosis, treatment, and/or medication information. It is not meant to be comprehensive and should be used as a tool to help the user understand and/or assess potential diagnostic and treatment options. It does NOT include all information about conditions, treatments, medications, side effects, or risks that may apply to a specific patient. It is not intended to be medical advice or a substitute for the medical advice, diagnosis, or treatment of a health care provider based on the health care provider's examination and assessment of a patient's specific and unique circumstances.  Patients must speak with a health care provider for complete information about their health, medical questions, and treatment options, including any risks or benefits regarding use of medications. This information does not endorse any treatments or medications as safe, effective, or approved for treating a specific patient. UpToDate, Inc. and its affiliates disclaim any warranty or liability relating to this information or the use thereof. The use of this information is governed by the Terms of Use, available at [employer]uy. com/en/know/clinical-effectiveness-terms. Tirzepatide: Patient drug information      Copyright 8009-5154 [facility] 240 rights reserved. Contributor Disclosures  (For additional information see \"Tirzepatide: Drug information\")    You must carefully read the \"Consumer Information Use and Disclaimer\" below in order to understand and correctly use this information. Brand Names: [place]  Mounjaro    Warning   This drug has been shown to cause thyroid cancer in some animals. It is not known if this happens in humans. If thyroid cancer happens, it may be deadly if not found and treated early. Call your doctor right away if you have a neck mass, trouble breathing, trouble swallowing, or have hoarseness that will not go away. Do not use this drug if you have a health problem called Multiple Endocrine Neoplasia syndrome type 2 (MEN 2), or if you or a family member have had thyroid cancer. Have your blood work checked and thyroid ultrasounds as you have been told by your doctor. What is this drug used for? It is used to lower blood sugar in patients with high blood sugar (diabetes). What do I need to tell my doctor BEFORE I take this drug? If you are allergic to this drug; any part of this drug; or any other drugs, foods, or substances. Tell your doctor about the allergy and what signs you had. If you have type 1 diabetes. Do not use this drug to treat type 1 diabetes.    If you have ever had pancreatitis. If you have stomach or bowel problems. This is not a list of all drugs or health problems that interact with this drug. Tell your doctor and pharmacist about all of your drugs (prescription or OTC, natural products, vitamins) and health problems. You must check to make sure that it is safe for you to take this drug with all of your drugs and health problems. Do not start, stop, or change the dose of any drug without checking with your doctor. What are some things I need to know or do while I take this drug? Tell all of your health care providers that you take this drug. This includes your doctors, nurses, pharmacists, and dentists. Wear disease medical alert ID (identification). Follow the diet and workout plan that your doctor told you about. Check your blood sugar as you have been told by your doctor. Do not drive if your blood sugar has been low. There is a greater chance of you having a crash. Birth control pills may not work as well to prevent pregnancy. If you take birth control pills, you may need to switch to another type of hormone-based birth control like a vaginal ring if your doctor tells you to. If another type of hormone-based birth control is not an option, use some other kind of birth control also, like a condom. Do this for 4 weeks after starting this drug and for 4 weeks each time the dose is raised. This drug may prevent other drugs taken by mouth from getting into the body. If you take other drugs by mouth, you may need to take them at some other time than this drug. Talk with your doctor. It may be harder to control blood sugar during times of stress such as fever, infection, injury, or surgery. A change in physical activity, exercise, or diet may also affect blood sugar. Talk with your doctor before you drink alcohol. Do not share with another person even if the needle has been changed.  Sharing your tray or pen may pass infections from one person to another. This includes infections you may not know you have. If you cannot drink liquids by mouth or if you have upset stomach, throwing up, or diarrhea that does not go away; you need to avoid getting dehydrated. Contact your doctor to find out what to do. Dehydration may lead to new or worse kidney problems. A severe and sometimes deadly pancreas problem (pancreatitis) has happened with other drugs like this one. Tell your doctor if you are pregnant, plan on getting pregnant, or are breast-feeding. You will need to talk about the benefits and risks to you and the baby. What are some side effects that I need to call my doctor about right away? WARNING/CAUTION: Even though it may be rare, some people may have very bad and sometimes deadly side effects when taking a drug. Tell your doctor or get medical help right away if you have any of the following signs or symptoms that may be related to a very bad side effect:   Signs of an allergic reaction, like rash; hives; itching; red, swollen, blistered, or peeling skin with or without fever; wheezing; tightness in the chest or throat; trouble breathing, swallowing, or talking; unusual hoarseness; or swelling of the mouth, face, lips, tongue, or throat. Signs of kidney problems like unable to pass urine, change in how much urine is passed, blood in the urine, or a big weight gain. Signs of gallbladder problems like pain in the upper right belly area, right shoulder area, or between the shoulder blades; yellow skin or eyes; fever with chills; bloating; or very upset stomach or throwing up. Signs of a pancreas problem (pancreatitis) like very bad stomach pain, very bad back pain, or very bad upset stomach or throwing up. Dizziness or passing out. A fast heartbeat. Change in eyesight. Low blood sugar can happen. The chance may be raised when this drug is used with other drugs for diabetes.  Signs may be dizziness, headache, feeling sleepy or weak, shaking, fast heartbeat, confusion, hunger, or sweating. Call your doctor right away if you have any of these signs. Follow what you have been told to do for low blood sugar. This may include taking glucose tablets, liquid glucose, or some fruit juices. What are some other side effects of this drug? All drugs may cause side effects. However, many people have no side effects or only have minor side effects. Call your doctor or get medical help if any of these side effects or any other side effects bother you or do not go away:   Constipation, diarrhea, stomach pain, upset stomach, throwing up, or feeling less hungry. Heartburn. These are not all of the side effects that may occur. If you have questions about side effects, call your doctor. Call your doctor for medical advice about side effects. You may report side effects to your national health agency. How is this drug best taken? Use this drug as ordered by your doctor. Read all information given to you. Follow all instructions closely. It is given as a shot into the fatty part of the skin on the top of the thigh, belly area, or upper arm. If you will be giving yourself the shot, your doctor or nurse will teach you how to give the shot. Keep taking this drug as you have been told by your doctor or other health care provider, even if you feel well. Take the same day each week. Move site where you give the shot each time. Take with or without food. Wash your hands before and after use. Do not use if the solution is leaking or has particles. This drug is colorless to a faint yellow. Do not use if the solution changes color. If you are also using insulin, you may inject this drug and the insulin in the same area of the body but not right next to each other. Do not mix this drug in the same syringe with insulin. Do not move this drug from the pen to a syringe. Each pen is for one use only.  Throw away any part of the used pen after the dose is given. Throw away needles in a needle/sharp disposal box. Do not reuse needles or other items. When the box is full, follow all local rules for getting rid of it. Talk with a doctor or pharmacist if you have any questions. What do I do if I miss a dose? If it is within 4 days after the missed dose, take the missed dose and go back to your normal day. If it has been more than 4 days since the missed dose, skip the missed dose and go back to your normal day. Do not take 2 doses at the same time or extra doses. How do I store and/or throw out this drug? Store in a refrigerator. Do not freeze. Do not use if it has been frozen. If needed, each pen may be stored at room temperature for up to 21 days. If you store at room temperature, throw away any part not used after 21 days. Protect from heat. Store in the original container to protect from light. Keep all drugs in a safe place. Keep all drugs out of the reach of children and pets. Throw away unused or  drugs. Do not flush down a toilet or pour down a drain unless you are told to do so. Check with your pharmacist if you have questions about the best way to throw out drugs. There may be drug take-back programs in your area. General drug facts   If your symptoms or health problems do not get better or if they become worse, call your doctor. Do not share your drugs with others and do not take anyone else's drugs. Some drugs may have another patient information leaflet. If you have any questions about this drug, please talk with your doctor, nurse, pharmacist, or other health care provider. If you think there has been an overdose, call your poison control center or get medical care right away. Be ready to tell or show what was taken, how much, and when it happened.     Last Reviewed [id number]  Consumer Information Use and Disclaimer   This generalized information is a limited summary of diagnosis, treatment, and/or medication information. It is not meant to be comprehensive and should be used as a tool to help the user understand and/or assess potential diagnostic and treatment options. It does NOT include all information about conditions, treatments, medications, side effects, or risks that may apply to a specific patient. It is not intended to be medical advice or a substitute for the medical advice, diagnosis, or treatment of a health care provider based on the health care provider's examination and assessment of a patient's specific and unique circumstances. Patients must speak with a health care provider for complete information about their health, medical questions, and treatment options, including any risks or benefits regarding use of medications. This information does not endorse any treatments or medications as safe, effective, or approved for treating a specific patient. UpToDate, Inc. and its affiliates disclaim any warranty or liability relating to this information or the use thereof. The use of this information is governed by the Terms of Use, available at [employer]uy. com/en/know/clinical-effectiveness-terms. © 2022 UpToDate, Inc. and its affiliates and/or [facility]. All rights reserved. Use of [employer] is subject to the Terms of Use.   Topic 808213 Version 3.0

## 2023-03-14 DIAGNOSIS — E03.8 HYPOTHYROIDISM DUE TO HASHIMOTO'S THYROIDITIS: ICD-10-CM

## 2023-03-14 DIAGNOSIS — E06.3 HYPOTHYROIDISM DUE TO HASHIMOTO'S THYROIDITIS: ICD-10-CM

## 2023-03-14 DIAGNOSIS — E55.9 VITAMIN D DEFICIENCY: ICD-10-CM

## 2023-03-14 RX ORDER — THYROID,PORK 90 MG
TABLET ORAL
Qty: 30 TABLET | Refills: 5 | Status: SHIPPED | OUTPATIENT
Start: 2023-03-14

## 2023-04-26 DIAGNOSIS — E66.9 OBESITY (BMI 30-39.9): ICD-10-CM

## 2023-04-26 DIAGNOSIS — E06.3 HYPOTHYROIDISM DUE TO HASHIMOTO'S THYROIDITIS: Primary | ICD-10-CM

## 2023-04-26 DIAGNOSIS — E55.9 VITAMIN D DEFICIENCY: ICD-10-CM

## 2023-04-26 DIAGNOSIS — E03.8 HYPOTHYROIDISM DUE TO HASHIMOTO'S THYROIDITIS: Primary | ICD-10-CM

## 2023-04-26 NOTE — TELEPHONE ENCOUNTER
Pt calling and states she would like a increase on her Qsymia 11.25. Pt states \"she's stuck\" and has been on the same dose for several months.  She uses [facility]# [phone]

## 2023-04-27 RX ORDER — PHENTERMINE AND TOPIRAMATE 15; 92 MG/1; MG/1
CAPSULE, EXTENDED RELEASE ORAL
Qty: 30 CAPSULE | Refills: 3 | Status: SHIPPED | OUTPATIENT
Start: 2023-04-27 | End: 2023-09-28

## 2023-05-31 DIAGNOSIS — E55.9 VITAMIN D DEFICIENCY: ICD-10-CM

## 2023-05-31 DIAGNOSIS — E06.3 HYPOTHYROIDISM DUE TO HASHIMOTO'S THYROIDITIS: ICD-10-CM

## 2023-05-31 DIAGNOSIS — E66.9 OBESITY (BMI 30-39.9): ICD-10-CM

## 2023-05-31 DIAGNOSIS — E03.8 HYPOTHYROIDISM DUE TO HASHIMOTO'S THYROIDITIS: ICD-10-CM

## 2023-05-31 LAB
25(OH)D3 SERPL-MCNC: 30.3 NG/ML
TSH SERPL DL<=0.005 MIU/L-ACNC: 1.26 UIU/ML (ref 0.27–4.2)

## 2023-06-01 LAB
ALBUMIN SERPL-MCNC: 4.7 G/DL (ref 3.4–5)
ALBUMIN/GLOB SERPL: 2.1 {RATIO} (ref 1.1–2.2)
ALP SERPL-CCNC: 70 U/L (ref 40–129)
ALT SERPL-CCNC: 22 U/L (ref 10–40)
ANION GAP SERPL CALCULATED.3IONS-SCNC: 11 MMOL/L (ref 3–16)
AST SERPL-CCNC: 26 U/L (ref 15–37)
BILIRUB SERPL-MCNC: 0.3 MG/DL (ref 0–1)
BUN SERPL-MCNC: 16 MG/DL (ref 7–20)
CALCIUM SERPL-MCNC: 9.5 MG/DL (ref 8.3–10.6)
CHLORIDE SERPL-SCNC: 109 MMOL/L (ref 99–110)
CO2 SERPL-SCNC: 23 MMOL/L (ref 21–32)
CREAT SERPL-MCNC: 1 MG/DL (ref 0.6–1.1)
EST. AVERAGE GLUCOSE BLD GHB EST-MCNC: 108.3 MG/DL
GFR SERPLBLD CREATININE-BSD FMLA CKD-EPI: >60 ML/MIN/{1.73_M2}
GLUCOSE SERPL-MCNC: 100 MG/DL (ref 70–99)
HBA1C MFR BLD: 5.4 %
POTASSIUM SERPL-SCNC: 4.4 MMOL/L (ref 3.5–5.1)
PROT SERPL-MCNC: 6.9 G/DL (ref 6.4–8.2)
SODIUM SERPL-SCNC: 143 MMOL/L (ref 136–145)

## 2023-06-05 ENCOUNTER — OFFICE VISIT (OUTPATIENT)
Dept: ENDOCRINOLOGY | Age: 53
End: 2023-06-05
Payer: COMMERCIAL

## 2023-06-05 VITALS
DIASTOLIC BLOOD PRESSURE: 79 MMHG | BODY MASS INDEX: 28.89 KG/M2 | HEIGHT: 62 IN | WEIGHT: 157 LBS | SYSTOLIC BLOOD PRESSURE: 121 MMHG | HEART RATE: 66 BPM | RESPIRATION RATE: 16 BRPM

## 2023-06-05 DIAGNOSIS — E06.3 HYPOTHYROIDISM DUE TO HASHIMOTO'S THYROIDITIS: Primary | ICD-10-CM

## 2023-06-05 DIAGNOSIS — E66.09 CLASS 1 OBESITY DUE TO EXCESS CALORIES WITHOUT SERIOUS COMORBIDITY WITH BODY MASS INDEX (BMI) OF 30.0 TO 30.9 IN ADULT: ICD-10-CM

## 2023-06-05 DIAGNOSIS — F41.9 ANXIETY: ICD-10-CM

## 2023-06-05 DIAGNOSIS — E03.8 HYPOTHYROIDISM DUE TO HASHIMOTO'S THYROIDITIS: Primary | ICD-10-CM

## 2023-06-05 DIAGNOSIS — E55.9 VITAMIN D DEFICIENCY: ICD-10-CM

## 2023-06-05 PROCEDURE — 99214 OFFICE O/P EST MOD 30 MIN: CPT | Performed by: INTERNAL MEDICINE

## 2023-06-05 RX ORDER — TIRZEPATIDE 2.5 MG/.5ML
2.5 INJECTION, SOLUTION SUBCUTANEOUS WEEKLY
Qty: 4 ADJUSTABLE DOSE PRE-FILLED PEN SYRINGE | Refills: 3 | Status: SHIPPED | OUTPATIENT
Start: 2023-06-05

## 2023-06-05 NOTE — PROGRESS NOTES
Hypothyroidism     Hypothyroidism due to Hashimoto's thyroiditis 1/17/2018     Patient Active Problem List    Diagnosis Date Noted    Hypothyroidism due to Hashimoto's thyroiditis 01/17/2018    Class 1 obesity due to excess calories without serious comorbidity with body mass index (BMI) of 30.0 to 30.9 in adult 01/17/2018    Anxiety 01/17/2018     History reviewed. No pertinent surgical history. Family History   Problem Relation Age of Onset    Cancer Mother     Cancer Father      Social History     Socioeconomic History    Marital status:      Spouse name: None    Number of children: None    Years of education: None    Highest education level: None   Tobacco Use    Smoking status: Never    Smokeless tobacco: Never   Vaping Use    Vaping Use: Never used   Substance and Sexual Activity    Alcohol use: Yes     Comment: sometime    Drug use: No     Current Outpatient Medications   Medication Sig Dispense Refill    Tirzepatide (MOUNJARO) 2.5 MG/0.5ML SOPN SC injection Inject 0.5 mLs into the skin once a week 4 Adjustable Dose Pre-filled Pen Syringe 3    Phentermine-Topiramate (QSYMIA) 15-92 MG CP24 Take 1 tablet by mouth daily 30 capsule 3    ARMOUR THYROID 90 MG tablet TAKE 1 TABLET BY MOUTH DAILY 30 tablet 5    levonorgestrel (MIRENA) IUD 52 mg by Intrauterine route once       No current facility-administered medications for this visit. Allergies   Allergen Reactions    Amoxicillin Rash    Penicillins Rash    Sulfa Antibiotics Rash     ROS  I have reviewed the review of system questionnaire filled by the patient .   Patient was advised to contact PCP for non endocrine signs and symptoms       OBJECTIVE:   /79   Pulse 66   Resp 16   Ht 5' 2\" (1.575 m)   Wt 157 lb (71.2 kg)   BMI 28.72 kg/m²   Wt Readings from Last 3 Encounters:   06/05/23 157 lb (71.2 kg)   11/28/22 161 lb (73 kg)   07/25/22 168 lb (76.2 kg)       Physical Exam:  Constitutional: no acute distress, well appearing, well

## 2023-06-07 ENCOUNTER — TELEPHONE (OUTPATIENT)
Dept: ENDOCRINOLOGY | Age: 53
End: 2023-06-07

## 2023-06-07 DIAGNOSIS — E66.09 CLASS 1 OBESITY DUE TO EXCESS CALORIES WITHOUT SERIOUS COMORBIDITY WITH BODY MASS INDEX (BMI) OF 30.0 TO 30.9 IN ADULT: Primary | ICD-10-CM

## 2023-06-07 DIAGNOSIS — E66.9 OBESITY (BMI 30-39.9): ICD-10-CM

## 2023-06-08 RX ORDER — SEMAGLUTIDE 0.25 MG/.5ML
0.25 INJECTION, SOLUTION SUBCUTANEOUS
Qty: 2 ML | Refills: 0 | Status: SHIPPED | OUTPATIENT
Start: 2023-06-08

## 2023-06-08 NOTE — TELEPHONE ENCOUNTER
Fax from [facility] w/ formulary exception PA forms [facility]

## 2023-06-08 NOTE — TELEPHONE ENCOUNTER
Patient made aware. According last office note, Dr. Mason Smith was going to send in for [facility]MONTSERRAT MORAES. Rx sent to pharmacy.

## 2023-06-08 NOTE — TELEPHONE ENCOUNTER
Fax from [phone]    This request has been denied.  Policy states that his medication may be cov'd when the requested product cannot be switched to a formulary alternative    Re: Dale Connolly

## 2023-07-12 ENCOUNTER — TELEPHONE (OUTPATIENT)
Dept: ENDOCRINOLOGY | Age: 53
End: 2023-07-12

## 2023-07-12 NOTE — TELEPHONE ENCOUNTER
Submitted PA for Ozempic  Via [facility] Key: [id number] STATUS: PENDING. Follow up done daily; if no response in three days we will refax for status check. If another three days goes by with no response we will call the insurance for status.

## 2023-07-13 NOTE — TELEPHONE ENCOUNTER
Fax from [phone] w/ kenzie for [address] [facility] covers this drug when it is used for certain health conditions.  Covered use is for DM-2

## 2023-09-11 ENCOUNTER — TELEPHONE (OUTPATIENT)
Dept: ENDOCRINOLOGY | Age: 53
End: 2023-09-11

## 2023-09-11 DIAGNOSIS — E66.9 OBESITY (BMI 30-39.9): ICD-10-CM

## 2023-09-11 RX ORDER — PHENTERMINE AND TOPIRAMATE 15; 92 MG/1; MG/1
CAPSULE, EXTENDED RELEASE ORAL
Qty: 30 CAPSULE | Refills: 2 | Status: SHIPPED | OUTPATIENT
Start: 2023-09-11 | End: 2024-02-12

## 2023-09-11 NOTE — TELEPHONE ENCOUNTER
Pt calling in for refill request for her Kenneth 90mg send to [facility] on [address]    Pt also needs Qsymia sent to [facility]    CB# [phone]

## 2023-11-20 ENCOUNTER — OFFICE VISIT (OUTPATIENT)
Dept: ENDOCRINOLOGY | Age: 53
End: 2023-11-20
Payer: COMMERCIAL

## 2023-11-20 VITALS
HEART RATE: 64 BPM | WEIGHT: 140.6 LBS | BODY MASS INDEX: 25.88 KG/M2 | SYSTOLIC BLOOD PRESSURE: 123 MMHG | RESPIRATION RATE: 16 BRPM | HEIGHT: 62 IN | DIASTOLIC BLOOD PRESSURE: 78 MMHG

## 2023-11-20 DIAGNOSIS — E03.8 HYPOTHYROIDISM DUE TO HASHIMOTO'S THYROIDITIS: ICD-10-CM

## 2023-11-20 DIAGNOSIS — E66.09 CLASS 1 OBESITY DUE TO EXCESS CALORIES WITHOUT SERIOUS COMORBIDITY WITH BODY MASS INDEX (BMI) OF 30.0 TO 30.9 IN ADULT: Primary | ICD-10-CM

## 2023-11-20 DIAGNOSIS — E06.3 HYPOTHYROIDISM DUE TO HASHIMOTO'S THYROIDITIS: ICD-10-CM

## 2023-11-20 DIAGNOSIS — E55.9 VITAMIN D DEFICIENCY: ICD-10-CM

## 2023-11-20 DIAGNOSIS — E66.9 OBESITY (BMI 30-39.9): ICD-10-CM

## 2023-11-20 LAB
25(OH)D3 SERPL-MCNC: 40.7 NG/ML
ALBUMIN SERPL-MCNC: 4.4 G/DL (ref 3.4–5)
ALBUMIN/GLOB SERPL: 2.1 {RATIO} (ref 1.1–2.2)
ALP SERPL-CCNC: 55 U/L (ref 40–129)
ALT SERPL-CCNC: 16 U/L (ref 10–40)
ANION GAP SERPL CALCULATED.3IONS-SCNC: 9 MMOL/L (ref 3–16)
AST SERPL-CCNC: 19 U/L (ref 15–37)
BILIRUB SERPL-MCNC: 0.3 MG/DL (ref 0–1)
BUN SERPL-MCNC: 20 MG/DL (ref 7–20)
CALCIUM SERPL-MCNC: 9.4 MG/DL (ref 8.3–10.6)
CHLORIDE SERPL-SCNC: 108 MMOL/L (ref 99–110)
CO2 SERPL-SCNC: 26 MMOL/L (ref 21–32)
CREAT SERPL-MCNC: 0.7 MG/DL (ref 0.6–1.1)
GFR SERPLBLD CREATININE-BSD FMLA CKD-EPI: >60 ML/MIN/{1.73_M2}
GLUCOSE SERPL-MCNC: 97 MG/DL (ref 70–99)
POTASSIUM SERPL-SCNC: 3.8 MMOL/L (ref 3.5–5.1)
PROT SERPL-MCNC: 6.5 G/DL (ref 6.4–8.2)
SODIUM SERPL-SCNC: 143 MMOL/L (ref 136–145)
T4 FREE SERPL-MCNC: 1 NG/DL (ref 0.9–1.8)
TSH SERPL DL<=0.005 MIU/L-ACNC: 1.09 UIU/ML (ref 0.27–4.2)

## 2023-11-20 PROCEDURE — 99214 OFFICE O/P EST MOD 30 MIN: CPT | Performed by: INTERNAL MEDICINE

## 2023-11-20 RX ORDER — TIRZEPATIDE 5 MG/.5ML
5 INJECTION, SOLUTION SUBCUTANEOUS WEEKLY
Qty: 4 ADJUSTABLE DOSE PRE-FILLED PEN SYRINGE | Refills: 5 | Status: SHIPPED | OUTPATIENT
Start: 2023-11-20

## 2023-11-20 RX ORDER — TIRZEPATIDE 5 MG/.5ML
5 INJECTION, SOLUTION SUBCUTANEOUS WEEKLY
Qty: 4 ADJUSTABLE DOSE PRE-FILLED PEN SYRINGE | Refills: 5 | Status: SHIPPED | OUTPATIENT
Start: 2023-11-20 | End: 2023-11-20 | Stop reason: SDUPTHER

## 2023-11-20 RX ORDER — TIRZEPATIDE 5 MG/.5ML
5 INJECTION, SOLUTION SUBCUTANEOUS WEEKLY
Qty: 12 ADJUSTABLE DOSE PRE-FILLED PEN SYRINGE | Refills: 2 | Status: SHIPPED | OUTPATIENT
Start: 2023-11-20

## 2023-11-20 RX ORDER — TIRZEPATIDE 2.5 MG/.5ML
2.5 INJECTION, SOLUTION SUBCUTANEOUS WEEKLY
COMMUNITY
End: 2023-11-20

## 2023-11-20 RX ORDER — PHENTERMINE AND TOPIRAMATE 15; 92 MG/1; MG/1
CAPSULE, EXTENDED RELEASE ORAL
Qty: 30 CAPSULE | Refills: 2 | Status: CANCELLED | OUTPATIENT
Start: 2023-11-20 | End: 2024-04-22

## 2023-11-20 NOTE — PROGRESS NOTES
distress, well appearing, well nourished  Psychiatric: oriented to person, place and time, judgement, insight and normal, recent and remote memory and intact and mood, affect are normal  Skin: skin and subcutaneous tissue is normal without mass,   Head and Face: examination of head and face revealed no abnormalities  Eyes: no lid or conjunctival swelling, no erythema or discharge, pupils are normal,   Ears/Nose: external inspection of ears and nose revealed no abnormalities, hearing is grossly normal  Oropharynx/Mouth/Face: lips, tongue and gums are normal with no lesions, the voice quality was normal  Neck: neck is supple and symmetric, with midline trachea and no masses, thyroid is normal    Pulmonary: no increased work of breathing or signs of respiratory distress, lungs are clear to auscultation  Cardiovascular: normal heart rate and rhythm, normal S1 and S2,   Musculoskeletal: normal gait and station,   Neurological: normal coordination, normal general cortical function      Lab Review:  Lab Results   Component Value Date/Time    TSH 1.26 05/31/2023 02:18 PM    TSH 0.89 11/25/2022 11:25 AM    TSH 1.31 07/22/2022 08:28 AM     No results found for: \"FREET4\"     ASSESSMENT/PLAN:    --- Hypothyroidism due to Hashimoto's thyroiditis  She has been on [place] 90 mg  for years now --- TSH 1.26 end of May 2023  Thyroid hormone levels are within normal limits  Thyroid usg revealed normal appearing thyroid lobes in feb 2012     RT partial lobectomy in 1991 with benign results     --- Class 1 obesity due to excess calories without serious comorbidity in adult, unspecified BMI  --Started Qsymia again in July 2022 she has lost 20 + lbs on it ---now plateau ---will stop Qsymia now in Nov 2023   She is on [facility] which dose will be increased to 5 mg weekly   She  previously she lost  27 lbs on Adipex 173>>146>>130>>126 lb in nov 2020   Stopped adipex in sept/oct 2019   Started Qsymia in oct 2019 ---stopped nov 2020       She

## 2024-02-23 DIAGNOSIS — E66.9 OBESITY (BMI 30-39.9): Primary | ICD-10-CM

## 2024-02-23 DIAGNOSIS — E66.09 CLASS 1 OBESITY DUE TO EXCESS CALORIES WITHOUT SERIOUS COMORBIDITY WITH BODY MASS INDEX (BMI) OF 30.0 TO 30.9 IN ADULT: ICD-10-CM

## 2024-02-27 RX ORDER — PHENTERMINE AND TOPIRAMATE 15; 92 MG/1; MG/1
CAPSULE, EXTENDED RELEASE ORAL
Qty: 30 CAPSULE | Refills: 3 | Status: SHIPPED | OUTPATIENT
Start: 2024-02-27 | End: 2024-08-13

## 2024-02-27 NOTE — TELEPHONE ENCOUNTER
Medication:   Requested Prescriptions     Pending Prescriptions Disp Refills    Phentermine-Topiramate (QSYMIA) 15-92 MG CP24 30 capsule      Sig: Take by mouth.        Last Filled:      Patient Phone Number: [phone] (home)

## 2024-03-20 DIAGNOSIS — E55.9 VITAMIN D DEFICIENCY: ICD-10-CM

## 2024-03-20 DIAGNOSIS — E06.3 HYPOTHYROIDISM DUE TO HASHIMOTO'S THYROIDITIS: ICD-10-CM

## 2024-03-20 DIAGNOSIS — E03.8 HYPOTHYROIDISM DUE TO HASHIMOTO'S THYROIDITIS: ICD-10-CM

## 2024-03-20 RX ORDER — THYROID,PORK 90 MG
TABLET ORAL
Qty: 30 TABLET | Refills: 5 | Status: SHIPPED | OUTPATIENT
Start: 2024-03-20

## 2024-05-15 ENCOUNTER — TELEPHONE (OUTPATIENT)
Dept: ENDOCRINOLOGY | Age: 54
End: 2024-05-15

## 2024-05-15 DIAGNOSIS — E66.9 OBESITY (BMI 30-39.9): Primary | ICD-10-CM

## 2024-05-15 NOTE — TELEPHONE ENCOUNTER
Mounjaro was previously denied.     I can resubmit but most likely would get denied due to pt not having a diagnosis of Type 2 diabetes. Please advise. Thanks!    If this requires a response please respond to the pool ( P MHCX PSC MEDICATION PRE-AUTH).      Thank you please advise patient.

## 2024-05-16 RX ORDER — TIRZEPATIDE 7.5 MG/.5ML
7.5 INJECTION, SOLUTION SUBCUTANEOUS WEEKLY
Qty: 2 ML | Refills: 2 | Status: SHIPPED | OUTPATIENT
Start: 2024-05-16

## 2024-05-16 NOTE — TELEPHONE ENCOUNTER
Submitted PA for Mounjaro  Via Counts include [facility] beds at the [facility] Key: [id number]  STATUS: PENDING.    Follow up done daily; if no decision with in three days we will refax.  If another three days goes by with no decision will call the insurance for status.

## 2024-05-16 NOTE — TELEPHONE ENCOUNTER
Patient aware. She states she has been taking Mounjaro and has been on the 5 mg dose for the past 2 months but her pharmacy states her insurance will not fill it anymore.     Advised we can see if you insurance will cover Zepbound. Rx sent for the 7.5 mg dose.

## 2024-05-16 NOTE — TELEPHONE ENCOUNTER
Fax from [employer]    Ins will only cover Mounjaro for type 2 DM. For obesity please prescribe Zepbound or Wegovy

## 2024-05-16 NOTE — TELEPHONE ENCOUNTER
The medication was DENIED; DENIAL letter uploaded to MEDIA.    If this requires a response please respond to the pool ( P MHCX PSC MEDICATION PRE-AUTH).      Thank you please advise patient.

## 2024-05-20 ENCOUNTER — TELEPHONE (OUTPATIENT)
Dept: ENDOCRINOLOGY | Age: 54
End: 2024-05-20

## 2024-05-20 DIAGNOSIS — E66.09 CLASS 1 OBESITY DUE TO EXCESS CALORIES WITHOUT SERIOUS COMORBIDITY WITH BODY MASS INDEX (BMI) OF 30.0 TO 30.9 IN ADULT: Primary | ICD-10-CM

## 2024-05-20 DIAGNOSIS — E66.9 OBESITY (BMI 30-39.9): ICD-10-CM

## 2024-05-20 NOTE — TELEPHONE ENCOUNTER
Submitted PA for Zepbound  Via [facility] Key: [id number]  STATUS: PENDING.    Follow up done daily; if no decision with in three days we will refax.  If another three days goes by with no decision will call the insurance for status.

## 2024-05-20 NOTE — TELEPHONE ENCOUNTER
2 faxes from [facility] not covered by plan. Pref alt is Lorri Cagle    Prior auth needed for Zepbound

## 2024-05-22 RX ORDER — SEMAGLUTIDE 2.4 MG/.75ML
2.4 INJECTION, SOLUTION SUBCUTANEOUS
Qty: 9 ML | Refills: 1 | Status: SHIPPED | OUTPATIENT
Start: 2024-05-22

## 2024-05-22 NOTE — TELEPHONE ENCOUNTER
Please switch to Wegovy if patient was on the pound higher doses we can switch to 2.4 mg of Wegovy

## 2024-05-23 ENCOUNTER — TELEPHONE (OUTPATIENT)
Dept: ENDOCRINOLOGY | Age: 54
End: 2024-05-23

## 2024-05-23 NOTE — TELEPHONE ENCOUNTER
Submitted PA for Wegovy  Via [facility] Key: [id number]  STATUS: PENDING.    Follow up done daily; if no decision with in three days we will refax.  If another three days goes by with no decision will call the insurance for status.

## 2024-06-05 NOTE — PROGRESS NOTES
SUBJECTIVE:    Pt seen for hypothyroidism Annie Liz had rt Thyroid lobectomy at age 26 years which ended up being benign. Dr Viral Ortega Pt denies any change in skin texture , denies any change in bowel habits , denies any heat or cold intolerance , denies any recent weight change ,denies any palpitation . Pt has been taking Lt4 regulary without any adverse events She stays tired and fatigued -- she has a son who needs to be cathetrized every 4 hrs so she goes to bed at 11.30 and wakes up to 6-7 hours   she has Depression and stress and was started on Zoloft But now doesn't take it any more   Interim History: she has been taking [place] thyroid   She denies any cold intolerance denies any palpitations   She took Contrave for 3 months with no wt loss and was very frustrated about that   She took  Adipex from  January 2017---nov 2017  she  lost almost approximately 20 lbs since December 2016   She feels her apetie decreased she was watching her diet   Noticed improvement in her tiredness   She had tried Contrave and didn't lose any weight     Since last visit in Nov 2017 she stopped taking Adipex but admits that she was not watching her diet either and was on vacation in addition to X mas and holidays she has gained 10 lbs but doesn't want to know exact weight         History of radiation to patient's neck: No  Resent iodine exposure: No   Family history includes no thyroid abnormalities.   Family history of thyroid cancer: No      Past Medical History:   Diagnosis Date    Anxiety 1/17/2018    Class 1 obesity due to excess calories without serious comorbidity with body mass index (BMI) of 30.0 to 30.9 in adult 1/17/2018    Hypothyroidism     Hypothyroidism due to Hashimoto's thyroiditis 1/17/2018     Patient Active Problem List    Diagnosis Date Noted    Hypothyroidism due to Hashimoto's thyroiditis 01/17/2018    Class 1 obesity due to excess calories without serious comorbidity with body mass index (BMI) of 30.0 Vacuum Pressure Low Setting (Will Not Render If Set To 0): 21 Tip: Hydropeel Tip, Teal Solution Override Number Of Passes Step 3: 2 Vacuum Pressure Low Setting (Will Not Render If Set To 0): 0 Solution Override: dermabuilder Location: face Procedure: Extend and Protect Solution: Activ-4 Procedure: Peel Tip: Hydropeel Tip, Clear Solution: Antiox-6 Procedure: Boost Number Of Passes Step 4: 1 Consent: Verbal  consent obtained, risks reviewed including but not limited to crusting, scabbing, blistering, scarring, darker or lighter pigmentary change, bruising, and/or incomplete response. Tip Override Procedure: Extraction Solution: GlySal 7.5% Post-Care Instructions: I reviewed with the patient in detail post-care instructions. Patient should stay away from the sun and wear sun protection until treated areas are fully healed. Price (Use Numbers Only, No Special Characters Or $): 199.00 Tip: Hydropeel Tip, Blue Procedure: Fusion Solution: Beta-HD Comments: pre and Post photos obtained during visit. Post care instruction given to patient. Recommended skincare products for patient. Revision Gentle Foaming Cleanser or the Papaya Enzyme Wash, followed with the Revision Revox Line Relaxer. Indication: anti-aging Procedure: Exfoliation

## 2024-06-07 DIAGNOSIS — E06.3 HYPOTHYROIDISM DUE TO HASHIMOTO'S THYROIDITIS: ICD-10-CM

## 2024-06-07 DIAGNOSIS — E66.09 CLASS 1 OBESITY DUE TO EXCESS CALORIES WITHOUT SERIOUS COMORBIDITY WITH BODY MASS INDEX (BMI) OF 30.0 TO 30.9 IN ADULT: ICD-10-CM

## 2024-06-07 DIAGNOSIS — E66.9 OBESITY (BMI 30-39.9): ICD-10-CM

## 2024-06-07 DIAGNOSIS — E55.9 VITAMIN D DEFICIENCY: ICD-10-CM

## 2024-06-07 DIAGNOSIS — E03.8 HYPOTHYROIDISM DUE TO HASHIMOTO'S THYROIDITIS: ICD-10-CM

## 2024-06-07 LAB
25(OH)D3 SERPL-MCNC: 30.7 NG/ML
ALBUMIN SERPL-MCNC: 4.2 G/DL (ref 3.4–5)
ALBUMIN/GLOB SERPL: 1.8 {RATIO} (ref 1.1–2.2)
ALP SERPL-CCNC: 93 U/L (ref 40–129)
ALT SERPL-CCNC: 140 U/L (ref 10–40)
ANION GAP SERPL CALCULATED.3IONS-SCNC: 12 MMOL/L (ref 3–16)
AST SERPL-CCNC: 87 U/L (ref 15–37)
BILIRUB SERPL-MCNC: 0.5 MG/DL (ref 0–1)
BUN SERPL-MCNC: 14 MG/DL (ref 7–20)
CALCIUM SERPL-MCNC: 9.3 MG/DL (ref 8.3–10.6)
CHLORIDE SERPL-SCNC: 106 MMOL/L (ref 99–110)
CHOLEST SERPL-MCNC: 251 MG/DL (ref 0–199)
CO2 SERPL-SCNC: 25 MMOL/L (ref 21–32)
CREAT SERPL-MCNC: 0.5 MG/DL (ref 0.6–1.1)
GFR SERPLBLD CREATININE-BSD FMLA CKD-EPI: >90 ML/MIN/{1.73_M2}
GLUCOSE SERPL-MCNC: 90 MG/DL (ref 70–99)
HDLC SERPL-MCNC: 91 MG/DL (ref 40–60)
LDLC SERPL CALC-MCNC: 144 MG/DL
POTASSIUM SERPL-SCNC: 4.1 MMOL/L (ref 3.5–5.1)
PROT SERPL-MCNC: 6.6 G/DL (ref 6.4–8.2)
SODIUM SERPL-SCNC: 143 MMOL/L (ref 136–145)
TRIGL SERPL-MCNC: 80 MG/DL (ref 0–150)
TSH SERPL DL<=0.005 MIU/L-ACNC: 3.61 UIU/ML (ref 0.27–4.2)
VLDLC SERPL CALC-MCNC: 16 MG/DL

## 2024-06-08 LAB
EST. AVERAGE GLUCOSE BLD GHB EST-MCNC: 99.7 MG/DL
HBA1C MFR BLD: 5.1 %

## 2024-06-10 ENCOUNTER — OFFICE VISIT (OUTPATIENT)
Dept: ENDOCRINOLOGY | Age: 54
End: 2024-06-10
Payer: COMMERCIAL

## 2024-06-10 VITALS
SYSTOLIC BLOOD PRESSURE: 133 MMHG | HEIGHT: 62 IN | WEIGHT: 150 LBS | DIASTOLIC BLOOD PRESSURE: 75 MMHG | HEART RATE: 83 BPM | BODY MASS INDEX: 27.6 KG/M2

## 2024-06-10 DIAGNOSIS — E06.3 HYPOTHYROIDISM DUE TO HASHIMOTO'S THYROIDITIS: Primary | ICD-10-CM

## 2024-06-10 DIAGNOSIS — E03.8 HYPOTHYROIDISM DUE TO HASHIMOTO'S THYROIDITIS: Primary | ICD-10-CM

## 2024-06-10 DIAGNOSIS — E66.3 OVERWEIGHT: ICD-10-CM

## 2024-06-10 DIAGNOSIS — F41.9 ANXIETY: ICD-10-CM

## 2024-06-10 PROCEDURE — 99214 OFFICE O/P EST MOD 30 MIN: CPT | Performed by: INTERNAL MEDICINE

## 2024-06-10 NOTE — PROGRESS NOTES
SUBJECTIVE:    Pt seen for hypothyroidism and obesity  TERI NAGY had rt Thyroid lobectomy at age 23 years which ended up being benign. Pt stays tired and fatigued -- she has a son who needed to be cathetrized every 4 hrs so she goes to bed at 11.30 and wakes up to 6-7 hours   she has Depression and stress and was started on Zoloft But now doesn't take it any more    She took Contrave for 3 months with no wt loss and was very frustrated about that   She took  Adipex from  January 2017---nov 2017  she  lost almost approximately 20 lbs since December 2016   She had tried Contrave and didn't lose any weight     We resumed Adipex in jan 2018  Lost additional 10 lbs by May 2018.  Stopped Adipex in May 2018.  She  gained 24 pounds after stopping adipex from may to April 2019     History of radiation to patient's neck: No  Resent iodine exposure: No   Family history includes no thyroid abnormalities.  Family history of thyroid cancer: No      She is on Adipex for 5 months it was clearly discussed with patient -Due to their side effects and potential for abuse, adipex use has a limit to short-term (?12 weeks) use.  Pt denies any adverse effects include increase in heart rate, blood pressure, insomnia, dry mouth, constipation, nervousness.  Pt doesn't have heart disease, poorly controlled hypertension, pulmonary hypertension, or history of addiction or drug abuse  Pt doesn't have a history of CVD, hyperthyroidism, glaucoma, MAO inhibitor-therapy, agitated states, pregnancy, or breast feeding.  She stopped  Adipex --- October 2019     INTERIM   --- Was taking Wegovy and was able to lose weight as  Recently finished a cruise to [place] and noticed some weight gain, has been eating poorly on the cruise as well as drinking some alcohol.    Past Medical History:   Diagnosis Date    Anxiety 1/17/2018    Class 1 obesity due to excess calories without serious comorbidity with body mass index (BMI) of 30.0 to 30.9 in adult

## 2024-06-11 ENCOUNTER — TELEPHONE (OUTPATIENT)
Dept: ENDOCRINOLOGY | Age: 54
End: 2024-06-11

## 2024-06-11 DIAGNOSIS — E66.09 CLASS 1 OBESITY DUE TO EXCESS CALORIES WITHOUT SERIOUS COMORBIDITY WITH BODY MASS INDEX (BMI) OF 30.0 TO 30.9 IN ADULT: Primary | ICD-10-CM

## 2024-06-11 RX ORDER — DULAGLUTIDE 0.75 MG/.5ML
INJECTION, SOLUTION SUBCUTANEOUS
Qty: 2 ML | Refills: 1 | Status: SHIPPED | OUTPATIENT
Start: 2024-06-11

## 2024-06-11 NOTE — TELEPHONE ENCOUNTER
Pt called wants to give the 2 medication her insurance will cover pt states which ever the Dr thinks is best please send to the pharmacy below      Trulicity  Victoza      [id number] - [place] - [address] - P [phone] - F [phone] [address] [place] [place] [address]  Phone: [phone]  Fax: [phone]

## 2024-06-12 ENCOUNTER — TELEPHONE (OUTPATIENT)
Dept: ENDOCRINOLOGY | Age: 54
End: 2024-06-12

## 2024-06-13 NOTE — TELEPHONE ENCOUNTER
The insurance will not cover Trulicity for any other diagnoses than type 2 diabetes and this patient does not have type 2 diabetes.  You can tell the patient that if her plan covers weight loss medication Wegovy or Zepbound that is the only way to get these medication

## 2024-06-13 NOTE — TELEPHONE ENCOUNTER
Submitted PA for Trulicity  Via [facility] Key: [id number]  STATUS: PENDING.    Follow up done daily; if no decision with in three days we will refax.  If another three days goes by with no decision will call the insurance for status.

## 2024-07-08 ENCOUNTER — TELEPHONE (OUTPATIENT)
Dept: ENDOCRINOLOGY | Age: 54
End: 2024-07-08

## 2024-07-11 DIAGNOSIS — E03.8 HYPOTHYROIDISM DUE TO HASHIMOTO'S THYROIDITIS: ICD-10-CM

## 2024-07-11 DIAGNOSIS — E06.3 HYPOTHYROIDISM DUE TO HASHIMOTO'S THYROIDITIS: ICD-10-CM

## 2024-07-11 DIAGNOSIS — F41.9 ANXIETY: ICD-10-CM

## 2024-07-11 DIAGNOSIS — E66.3 OVERWEIGHT: ICD-10-CM

## 2024-07-11 LAB
ALBUMIN SERPL-MCNC: 4.7 G/DL (ref 3.4–5)
ALBUMIN/GLOB SERPL: 2 {RATIO} (ref 1.1–2.2)
ALP SERPL-CCNC: 81 U/L (ref 40–129)
ALT SERPL-CCNC: 19 U/L (ref 10–40)
ANION GAP SERPL CALCULATED.3IONS-SCNC: 13 MMOL/L (ref 3–16)
AST SERPL-CCNC: 23 U/L (ref 15–37)
BILIRUB SERPL-MCNC: 0.3 MG/DL (ref 0–1)
BUN SERPL-MCNC: 22 MG/DL (ref 7–20)
CALCIUM SERPL-MCNC: 9.6 MG/DL (ref 8.3–10.6)
CHLORIDE SERPL-SCNC: 108 MMOL/L (ref 99–110)
CHOLEST SERPL-MCNC: 185 MG/DL (ref 0–199)
CO2 SERPL-SCNC: 20 MMOL/L (ref 21–32)
CREAT SERPL-MCNC: 0.7 MG/DL (ref 0.6–1.1)
GFR SERPLBLD CREATININE-BSD FMLA CKD-EPI: >90 ML/MIN/{1.73_M2}
GLUCOSE SERPL-MCNC: 86 MG/DL (ref 70–99)
HDLC SERPL-MCNC: 84 MG/DL (ref 40–60)
LDLC SERPL CALC-MCNC: 93 MG/DL
POTASSIUM SERPL-SCNC: 4.6 MMOL/L (ref 3.5–5.1)
PROT SERPL-MCNC: 7 G/DL (ref 6.4–8.2)
SODIUM SERPL-SCNC: 141 MMOL/L (ref 136–145)
TRIGL SERPL-MCNC: 42 MG/DL (ref 0–150)
TSH SERPL DL<=0.005 MIU/L-ACNC: 0.67 UIU/ML (ref 0.27–4.2)
VLDLC SERPL CALC-MCNC: 8 MG/DL

## 2024-09-11 DIAGNOSIS — E66.9 OBESITY (BMI 30-39.9): ICD-10-CM

## 2024-09-11 DIAGNOSIS — E66.09 CLASS 1 OBESITY DUE TO EXCESS CALORIES WITHOUT SERIOUS COMORBIDITY WITH BODY MASS INDEX (BMI) OF 30.0 TO 30.9 IN ADULT: ICD-10-CM

## 2024-09-16 RX ORDER — PHENTERMINE AND TOPIRAMATE 15; 92 MG/1; MG/1
CAPSULE, EXTENDED RELEASE ORAL
Qty: 30 CAPSULE | Refills: 3 | Status: SHIPPED | OUTPATIENT
Start: 2024-09-16 | End: 2025-02-26

## 2024-10-01 DIAGNOSIS — E55.9 VITAMIN D DEFICIENCY: ICD-10-CM

## 2024-10-01 DIAGNOSIS — E06.3 HYPOTHYROIDISM DUE TO HASHIMOTO'S THYROIDITIS: ICD-10-CM

## 2024-10-01 RX ORDER — THYROID,PORK 90 MG
TABLET ORAL
Qty: 30 TABLET | Refills: 1 | Status: SHIPPED | OUTPATIENT
Start: 2024-10-01

## 2024-11-20 ENCOUNTER — TELEPHONE (OUTPATIENT)
Dept: ENDOCRINOLOGY | Age: 54
End: 2024-11-20

## 2024-11-21 NOTE — TELEPHONE ENCOUNTER
There is already a Denial on file for Trulicity.  Please see TE encounter from 6/12/2024.    If this requires a response please respond to the pool ( P MHCX PSC MEDICATION PRE-AUTH).      Thank you please advise patient.

## 2024-11-21 NOTE — TELEPHONE ENCOUNTER
Noted and patient was informed at time of denial. We did not send a new prescription for Trulicity. I'm not sure why this PA was requested. Will close encounter.

## 2024-11-27 DIAGNOSIS — E06.3 HYPOTHYROIDISM DUE TO HASHIMOTO'S THYROIDITIS: ICD-10-CM

## 2024-11-27 DIAGNOSIS — E55.9 VITAMIN D DEFICIENCY: ICD-10-CM

## 2024-11-27 RX ORDER — THYROID,PORK 90 MG
TABLET ORAL
Qty: 30 TABLET | Refills: 0 | Status: SHIPPED | OUTPATIENT
Start: 2024-11-27

## 2024-12-11 ENCOUNTER — TELEPHONE (OUTPATIENT)
Dept: ENDOCRINOLOGY | Age: 54
End: 2024-12-11

## 2024-12-11 DIAGNOSIS — E06.3 HYPOTHYROIDISM DUE TO HASHIMOTO'S THYROIDITIS: Primary | ICD-10-CM

## 2024-12-11 DIAGNOSIS — E66.9 OBESITY (BMI 30-39.9): ICD-10-CM

## 2024-12-11 DIAGNOSIS — Z78.0 MENOPAUSE: ICD-10-CM

## 2024-12-11 NOTE — TELEPHONE ENCOUNTER
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

## 2025-01-08 DIAGNOSIS — E55.9 VITAMIN D DEFICIENCY: ICD-10-CM

## 2025-01-08 DIAGNOSIS — E06.3 HYPOTHYROIDISM DUE TO HASHIMOTO'S THYROIDITIS: ICD-10-CM

## 2025-01-08 RX ORDER — THYROID,PORK 90 MG
TABLET ORAL
Qty: 30 TABLET | Refills: 0 | Status: SHIPPED | OUTPATIENT
Start: 2025-01-08

## 2025-01-20 ENCOUNTER — OFFICE VISIT (OUTPATIENT)
Dept: ENDOCRINOLOGY | Age: 55
End: 2025-01-20
Payer: COMMERCIAL

## 2025-01-20 VITALS
HEIGHT: 62 IN | WEIGHT: 167 LBS | HEART RATE: 58 BPM | DIASTOLIC BLOOD PRESSURE: 81 MMHG | BODY MASS INDEX: 30.73 KG/M2 | TEMPERATURE: 98 F | SYSTOLIC BLOOD PRESSURE: 126 MMHG | RESPIRATION RATE: 14 BRPM

## 2025-01-20 DIAGNOSIS — E06.3 HYPOTHYROIDISM DUE TO HASHIMOTO'S THYROIDITIS: Primary | ICD-10-CM

## 2025-01-20 DIAGNOSIS — E66.9 OBESITY (BMI 30-39.9): ICD-10-CM

## 2025-01-20 DIAGNOSIS — E66.3 OVERWEIGHT: ICD-10-CM

## 2025-01-20 DIAGNOSIS — E55.9 VITAMIN D DEFICIENCY: ICD-10-CM

## 2025-01-20 DIAGNOSIS — F41.9 ANXIETY: ICD-10-CM

## 2025-01-20 DIAGNOSIS — E06.3 HYPOTHYROIDISM DUE TO HASHIMOTO'S THYROIDITIS: ICD-10-CM

## 2025-01-20 DIAGNOSIS — Z78.0 MENOPAUSE: ICD-10-CM

## 2025-01-20 LAB
25(OH)D3 SERPL-MCNC: 27.5 NG/ML
ALBUMIN SERPL-MCNC: 4.5 G/DL (ref 3.4–5)
ALBUMIN/GLOB SERPL: 1.9 {RATIO} (ref 1.1–2.2)
ALP SERPL-CCNC: 80 U/L (ref 40–129)
ALT SERPL-CCNC: 39 U/L (ref 10–40)
ANION GAP SERPL CALCULATED.3IONS-SCNC: 11 MMOL/L (ref 3–16)
AST SERPL-CCNC: 31 U/L (ref 15–37)
BILIRUB SERPL-MCNC: 0.3 MG/DL (ref 0–1)
BUN SERPL-MCNC: 19 MG/DL (ref 7–20)
CALCIUM SERPL-MCNC: 9.8 MG/DL (ref 8.3–10.6)
CHLORIDE SERPL-SCNC: 109 MMOL/L (ref 99–110)
CHOLEST SERPL-MCNC: 164 MG/DL (ref 0–199)
CO2 SERPL-SCNC: 24 MMOL/L (ref 21–32)
CREAT SERPL-MCNC: 0.7 MG/DL (ref 0.6–1.1)
FSH SERPL-ACNC: 90.6 MIU/ML
GFR SERPLBLD CREATININE-BSD FMLA CKD-EPI: >90 ML/MIN/{1.73_M2}
GLUCOSE SERPL-MCNC: 85 MG/DL (ref 70–99)
HDLC SERPL-MCNC: 73 MG/DL (ref 40–60)
LDLC SERPL CALC-MCNC: 81 MG/DL
POTASSIUM SERPL-SCNC: 4.3 MMOL/L (ref 3.5–5.1)
PROT SERPL-MCNC: 6.9 G/DL (ref 6.4–8.2)
SODIUM SERPL-SCNC: 144 MMOL/L (ref 136–145)
TRIGL SERPL-MCNC: 50 MG/DL (ref 0–150)
TSH SERPL DL<=0.005 MIU/L-ACNC: 0.81 UIU/ML (ref 0.27–4.2)
VLDLC SERPL CALC-MCNC: 10 MG/DL

## 2025-01-20 PROCEDURE — 99214 OFFICE O/P EST MOD 30 MIN: CPT | Performed by: INTERNAL MEDICINE

## 2025-01-20 RX ORDER — BIOTIN 5 MG
1 TABLET ORAL
COMMUNITY

## 2025-01-20 RX ORDER — MULTIVIT-MIN/IRON/FOLIC ACID/K 18-600-40
1 CAPSULE ORAL
COMMUNITY

## 2025-01-20 RX ORDER — THYROID,PORK 90 MG
TABLET ORAL
Qty: 30 TABLET | Refills: 5 | Status: SHIPPED | OUTPATIENT
Start: 2025-01-20

## 2025-01-20 NOTE — PROGRESS NOTES
SUBJECTIVE:    Pt seen for hypothyroidism and obesity   TERI NAGY had rt Thyroid lobectomy at age 23 years which ended up being benign. Pt stays tired and fatigued -- she has a son who needed to be cathetrized every 4 hrs so she goes to bed at 11.30 and wakes up to 6-7 hours   she has Depression and stress and was started on Zoloft But now doesn't take it any more    She took Contrave for 3 months with no wt loss and was very frustrated about that   She took  Adipex from  January 2017---nov 2017  she  lost almost approximately 20 lbs since December 2016   She had tried Contrave and didn't lose any weight   We resumed Adipex in jan 2018  Lost additional 10 lbs by May 2018.  Stopped Adipex in May 2018.  She  gained 24 pounds after stopping adipex from may to April 2019     History of radiation to patient's neck: No  Resent iodine exposure: No   Family history includes no thyroid abnormalities.  Family history of thyroid cancer: No    She is on Adipex for 5 months it was clearly discussed with patient -Due to their side effects and potential for abuse, adipex use has a limit to short-term (<=12 weeks) use.  Pt denies any adverse effects include increase in heart rate, blood pressure, insomnia, dry mouth, constipation, nervousness.  Pt doesn't have heart disease, poorly controlled hypertension, pulmonary hypertension, or history of addiction or drug abuse  Pt doesn't have a history of CVD, hyperthyroidism, glaucoma, MAO inhibitor-therapy, agitated states, pregnancy, or breast feeding.  She stopped  Adipex --- October 2019     INTERIM   --- previously took  Wegovy and was able to lose weight --now plans on starting low carb diet , she we will start exercising      Past Medical History:   Diagnosis Date    Anxiety 1/17/2018    Class 1 obesity due to excess calories without serious comorbidity with body mass index (BMI) of 30.0 to 30.9 in adult 1/17/2018    Hypothyroidism     Hypothyroidism due to Hashimoto's

## 2025-01-21 LAB
EST. AVERAGE GLUCOSE BLD GHB EST-MCNC: 96.8 MG/DL
HBA1C MFR BLD: 5 %

## 2025-01-30 ENCOUNTER — TELEPHONE (OUTPATIENT)
Dept: ENDOCRINOLOGY | Age: 55
End: 2025-01-30

## 2025-02-03 NOTE — TELEPHONE ENCOUNTER
Submitted PA for Zepbound  Via [facility] Key: [id number] STATUS: PENDING.    Follow up done daily; if no decision with in three days we will refax.  If another three days goes by with no decision will call the insurance for status.

## 2025-02-04 NOTE — TELEPHONE ENCOUNTER
The medication was DENIED; DENIAL letter is uploaded to MEDIA.      Drug is not covered by the plan ( Plan Exclusion)       If this requires a response please respond to the pool ( P MHCX PSC MEDICATION PRE-AUTH).      Thank you please advise patient.     193.04

## 2025-04-02 DIAGNOSIS — E66.09 CLASS 1 OBESITY DUE TO EXCESS CALORIES WITHOUT SERIOUS COMORBIDITY WITH BODY MASS INDEX (BMI) OF 30.0 TO 30.9 IN ADULT: ICD-10-CM

## 2025-04-02 DIAGNOSIS — E66.9 OBESITY (BMI 30-39.9): ICD-10-CM

## 2025-04-02 DIAGNOSIS — E66.811 CLASS 1 OBESITY DUE TO EXCESS CALORIES WITHOUT SERIOUS COMORBIDITY WITH BODY MASS INDEX (BMI) OF 30.0 TO 30.9 IN ADULT: ICD-10-CM

## 2025-04-02 RX ORDER — PHENTERMINE AND TOPIRAMATE 15; 92 MG/1; MG/1
CAPSULE, EXTENDED RELEASE ORAL
Qty: 30 CAPSULE | Refills: 3 | Status: CANCELLED | OUTPATIENT
Start: 2025-04-02 | End: 2025-09-12

## 2025-04-02 RX ORDER — PHENTERMINE AND TOPIRAMATE 15; 92 MG/1; MG/1
CAPSULE, EXTENDED RELEASE ORAL
Qty: 30 CAPSULE | Refills: 3 | Status: SHIPPED | OUTPATIENT
Start: 2025-04-02 | End: 2025-09-12

## 2025-04-02 NOTE — TELEPHONE ENCOUNTER
Medication:   Requested Prescriptions     Pending Prescriptions Disp Refills    Phentermine-Topiramate (QSYMIA) 15-92 MG CP24 30 capsule 3     Si capsule daily        Last Filled:      Patient Phone Number: [phone] (home)     Last appt: 25  Next appt: 25    Last OARRS:        No data to display

## 2025-04-02 NOTE — TELEPHONE ENCOUNTER
LMOM for patient to call office and confirm if she is currently taking Qsymia or if this is a restart.     If she is currently taking it, please verify dose. If she is not, please verify approximately  when she last took it.

## 2025-07-03 DIAGNOSIS — E06.3 HYPOTHYROIDISM DUE TO HASHIMOTO'S THYROIDITIS: ICD-10-CM

## 2025-07-03 DIAGNOSIS — E55.9 VITAMIN D DEFICIENCY: ICD-10-CM

## 2025-07-04 LAB
ALBUMIN SERPL-MCNC: 4.6 G/DL (ref 3.4–5)
ALBUMIN/GLOB SERPL: 1.8 {RATIO} (ref 1.1–2.2)
ALP SERPL-CCNC: 66 U/L (ref 40–129)
ALT SERPL-CCNC: 25 U/L (ref 10–40)
ANION GAP SERPL CALCULATED.3IONS-SCNC: 12 MMOL/L (ref 3–16)
AST SERPL-CCNC: 24 U/L (ref 15–37)
BILIRUB SERPL-MCNC: 0.3 MG/DL (ref 0–1)
BUN SERPL-MCNC: 21 MG/DL (ref 7–20)
CALCIUM SERPL-MCNC: 9.6 MG/DL (ref 8.3–10.6)
CHLORIDE SERPL-SCNC: 106 MMOL/L (ref 99–110)
CHOLEST SERPL-MCNC: 161 MG/DL (ref 0–199)
CO2 SERPL-SCNC: 22 MMOL/L (ref 21–32)
CREAT SERPL-MCNC: 0.8 MG/DL (ref 0.6–1.1)
CREAT UR-MCNC: 18.9 MG/DL (ref 28–259)
EST. AVERAGE GLUCOSE BLD GHB EST-MCNC: 108.3 MG/DL
GFR SERPLBLD CREATININE-BSD FMLA CKD-EPI: 88 ML/MIN/{1.73_M2}
GLUCOSE SERPL-MCNC: 90 MG/DL (ref 70–99)
HBA1C MFR BLD: 5.4 %
HDLC SERPL-MCNC: 70 MG/DL (ref 40–60)
LDLC SERPL CALC-MCNC: 85 MG/DL
MICROALBUMIN UR DL<=1MG/L-MCNC: <1.2 MG/DL
MICROALBUMIN/CREAT UR: ABNORMAL MG/G (ref 0–30)
POTASSIUM SERPL-SCNC: 4.1 MMOL/L (ref 3.5–5.1)
PROT SERPL-MCNC: 7.1 G/DL (ref 6.4–8.2)
SODIUM SERPL-SCNC: 140 MMOL/L (ref 136–145)
TRIGL SERPL-MCNC: 32 MG/DL (ref 0–150)
TSH SERPL DL<=0.005 MIU/L-ACNC: 0.36 UIU/ML (ref 0.27–4.2)
VLDLC SERPL CALC-MCNC: 6 MG/DL

## 2025-07-07 ENCOUNTER — OFFICE VISIT (OUTPATIENT)
Dept: ENDOCRINOLOGY | Age: 55
End: 2025-07-07
Payer: COMMERCIAL

## 2025-07-07 VITALS
HEART RATE: 72 BPM | HEIGHT: 62 IN | BODY MASS INDEX: 30.55 KG/M2 | DIASTOLIC BLOOD PRESSURE: 75 MMHG | WEIGHT: 166 LBS | SYSTOLIC BLOOD PRESSURE: 125 MMHG

## 2025-07-07 DIAGNOSIS — E06.3 HYPOTHYROIDISM DUE TO HASHIMOTO'S THYROIDITIS: Primary | ICD-10-CM

## 2025-07-07 DIAGNOSIS — E55.9 VITAMIN D DEFICIENCY: ICD-10-CM

## 2025-07-07 DIAGNOSIS — E66.9 OBESITY (BMI 30-39.9): ICD-10-CM

## 2025-07-07 PROCEDURE — 99214 OFFICE O/P EST MOD 30 MIN: CPT | Performed by: INTERNAL MEDICINE

## 2025-07-07 RX ORDER — TIRZEPATIDE 2.5 MG/.5ML
2.5 INJECTION, SOLUTION SUBCUTANEOUS WEEKLY
Qty: 2 ML | Refills: 2 | Status: SHIPPED | COMMUNITY
Start: 2025-07-07

## 2025-07-07 NOTE — PROGRESS NOTES
SUBJECTIVE:    Pt seen for hypothyroidism and obesity   TERI NAGY had rt Thyroid lobectomy at age 23 years which ended up being benign. Pt stays tired and fatigued -- she has a son who needed to be cathetrized every 4 hrs so she goes to bed at 11.30 and wakes up to 6-7 hours   she has Depression and stress and was started on Zoloft But now doesn't take it any more    She took Contrave for 3 months with no wt loss and was very frustrated about that   She took  Adipex from  January 2017---nov 2017  she  lost almost approximately 20 lbs since December 2016   She had tried Contrave and didn't lose any weight   We resumed Adipex in jan 2018  Lost additional 10 lbs by May 2018.  Stopped Adipex in May 2018.  She  gained 24 pounds after stopping adipex from may to April 2019     History of radiation to patient's neck: No  Resent iodine exposure: No   Family history includes no thyroid abnormalities.  Family history of thyroid cancer: No    She is on Adipex for 5 months it was clearly discussed with patient -Due to their side effects and potential for abuse, adipex use has a limit to short-term (<=12 weeks) use.  Pt denies any adverse effects include increase in heart rate, blood pressure, insomnia, dry mouth, constipation, nervousness.  Pt doesn't have heart disease, poorly controlled hypertension, pulmonary hypertension, or history of addiction or drug abuse  Pt doesn't have a history of CVD, hyperthyroidism, glaucoma, MAO inhibitor-therapy, agitated states, pregnancy, or breast feeding.  She stopped  Adipex --- October 2019     INTERIM   --- previously took  Wegovy and was able to lose weight --  Frustrated about weight gain although weight has been stable since the last visit    Past Medical History:   Diagnosis Date    Anxiety 1/17/2018    Class 1 obesity due to excess calories without serious comorbidity with body mass index (BMI) of 30.0 to 30.9 in adult 1/17/2018    Hypothyroidism     Hypothyroidism due to

## 2025-08-04 ENCOUNTER — TELEPHONE (OUTPATIENT)
Dept: ENDOCRINOLOGY | Age: 55
End: 2025-08-04

## 2025-08-04 DIAGNOSIS — E55.9 VITAMIN D DEFICIENCY: ICD-10-CM

## 2025-08-04 DIAGNOSIS — E66.9 OBESITY (BMI 30-39.9): Primary | ICD-10-CM

## 2025-08-04 DIAGNOSIS — E06.3 HYPOTHYROIDISM DUE TO HASHIMOTO'S THYROIDITIS: ICD-10-CM

## 2025-08-04 RX ORDER — THYROID,PORK 90 MG
90 TABLET ORAL DAILY
Qty: 30 TABLET | Refills: 2 | Status: SHIPPED | OUTPATIENT
Start: 2025-08-04